# Patient Record
Sex: FEMALE | Race: WHITE | NOT HISPANIC OR LATINO | Employment: OTHER | URBAN - METROPOLITAN AREA
[De-identification: names, ages, dates, MRNs, and addresses within clinical notes are randomized per-mention and may not be internally consistent; named-entity substitution may affect disease eponyms.]

---

## 2023-06-24 RX ORDER — TRAZODONE HYDROCHLORIDE 50 MG/1
50 TABLET ORAL NIGHTLY
COMMUNITY
Start: 2013-07-10 | End: 2023-07-05 | Stop reason: SDUPTHER

## 2023-06-24 RX ORDER — AMLODIPINE BESYLATE 5 MG/1
1 TABLET ORAL DAILY
COMMUNITY
Start: 2022-08-29 | End: 2023-10-03 | Stop reason: ALTCHOICE

## 2023-06-24 RX ORDER — LOSARTAN POTASSIUM 25 MG/1
1 TABLET ORAL DAILY
COMMUNITY
Start: 2022-11-08 | End: 2023-07-05 | Stop reason: SDUPTHER

## 2023-06-24 RX ORDER — LEVOTHYROXINE SODIUM 112 UG/1
1 TABLET ORAL DAILY
COMMUNITY
Start: 2012-12-14 | End: 2023-07-05 | Stop reason: SDUPTHER

## 2023-06-24 RX ORDER — ASPIRIN 81 MG/1
1 TABLET ORAL DAILY
COMMUNITY
Start: 2022-08-29

## 2023-06-24 RX ORDER — ATORVASTATIN CALCIUM 40 MG/1
1 TABLET, FILM COATED ORAL DAILY
COMMUNITY
Start: 2022-02-01 | End: 2024-01-31 | Stop reason: SDUPTHER

## 2023-07-01 PROBLEM — I10 HYPERTENSION, ESSENTIAL: Status: ACTIVE | Noted: 2023-07-01

## 2023-07-01 PROBLEM — R41.3 MEMORY DIFFICULTIES: Status: ACTIVE | Noted: 2023-07-01

## 2023-07-01 PROBLEM — E55.9 VITAMIN D DEFICIENCY: Status: ACTIVE | Noted: 2023-07-01

## 2023-07-01 PROBLEM — N39.0 ACUTE UTI: Status: ACTIVE | Noted: 2023-07-01

## 2023-07-01 PROBLEM — H91.93 HEARING LOSS, BILATERAL: Status: ACTIVE | Noted: 2023-07-01

## 2023-07-01 PROBLEM — M81.0 SENILE OSTEOPOROSIS: Status: ACTIVE | Noted: 2023-07-01

## 2023-07-01 PROBLEM — R30.0 DYSURIA: Status: ACTIVE | Noted: 2023-07-01

## 2023-07-01 PROBLEM — G89.29 CHRONIC BILATERAL LOW BACK PAIN: Status: ACTIVE | Noted: 2023-07-01

## 2023-07-01 PROBLEM — H26.9 CATARACT, BILATERAL: Status: ACTIVE | Noted: 2023-07-01

## 2023-07-01 PROBLEM — E78.5 HYPERLIPIDEMIA: Status: ACTIVE | Noted: 2023-07-01

## 2023-07-01 PROBLEM — G47.00 INSOMNIA: Status: ACTIVE | Noted: 2023-07-01

## 2023-07-01 PROBLEM — K57.90 DIVERTICULOSIS: Status: ACTIVE | Noted: 2023-07-01

## 2023-07-01 PROBLEM — F41.9 ANXIETY DISORDER: Status: ACTIVE | Noted: 2023-07-01

## 2023-07-01 PROBLEM — H93.19 TINNITUS: Status: ACTIVE | Noted: 2023-07-01

## 2023-07-01 PROBLEM — I42.8 CARDIOMYOPATHY, NONISCHEMIC (MULTI): Status: ACTIVE | Noted: 2023-07-01

## 2023-07-01 PROBLEM — M85.80 OSTEOPENIA: Status: ACTIVE | Noted: 2023-07-01

## 2023-07-01 PROBLEM — E03.9 HYPOTHYROID: Status: ACTIVE | Noted: 2023-07-01

## 2023-07-01 PROBLEM — R06.09 DYSPNEA ON EXERTION: Status: ACTIVE | Noted: 2023-07-01

## 2023-07-01 PROBLEM — I69.321: Status: ACTIVE | Noted: 2023-07-01

## 2023-07-01 PROBLEM — H90.3 BILATERAL SENSORINEURAL HEARING LOSS: Status: ACTIVE | Noted: 2023-07-01

## 2023-07-01 PROBLEM — I49.9 CARDIAC ARRHYTHMIA: Status: ACTIVE | Noted: 2023-07-01

## 2023-07-01 PROBLEM — I63.81: Status: ACTIVE | Noted: 2023-07-01

## 2023-07-01 PROBLEM — M54.50 CHRONIC BILATERAL LOW BACK PAIN: Status: ACTIVE | Noted: 2023-07-01

## 2023-07-01 PROBLEM — R00.2 PALPITATIONS: Status: ACTIVE | Noted: 2023-07-01

## 2023-07-01 PROBLEM — I44.7 LEFT BUNDLE BRANCH BLOCK (LBBB): Status: ACTIVE | Noted: 2023-07-01

## 2023-07-01 PROBLEM — R53.83 FATIGUE: Status: ACTIVE | Noted: 2023-07-01

## 2023-07-01 PROBLEM — R94.31 ABNORMAL ECG: Status: ACTIVE | Noted: 2023-07-01

## 2023-07-01 PROBLEM — I63.9 CVA (CEREBRAL VASCULAR ACCIDENT) (MULTI): Status: ACTIVE | Noted: 2023-07-01

## 2023-07-05 ENCOUNTER — OFFICE VISIT (OUTPATIENT)
Dept: PRIMARY CARE | Facility: CLINIC | Age: 83
End: 2023-07-05
Payer: MEDICARE

## 2023-07-05 VITALS
BODY MASS INDEX: 23.32 KG/M2 | DIASTOLIC BLOOD PRESSURE: 52 MMHG | OXYGEN SATURATION: 98 % | WEIGHT: 140 LBS | HEART RATE: 73 BPM | SYSTOLIC BLOOD PRESSURE: 116 MMHG | HEIGHT: 65 IN

## 2023-07-05 DIAGNOSIS — E03.9 HYPOTHYROIDISM, UNSPECIFIED TYPE: ICD-10-CM

## 2023-07-05 DIAGNOSIS — G47.00 INSOMNIA, UNSPECIFIED TYPE: Primary | ICD-10-CM

## 2023-07-05 DIAGNOSIS — R53.83 FATIGUE, UNSPECIFIED TYPE: ICD-10-CM

## 2023-07-05 DIAGNOSIS — I42.8 CARDIOMYOPATHY, NONISCHEMIC (MULTI): ICD-10-CM

## 2023-07-05 DIAGNOSIS — I10 HYPERTENSION, UNSPECIFIED TYPE: ICD-10-CM

## 2023-07-05 PROCEDURE — 1036F TOBACCO NON-USER: CPT | Performed by: FAMILY MEDICINE

## 2023-07-05 PROCEDURE — 3078F DIAST BP <80 MM HG: CPT | Performed by: FAMILY MEDICINE

## 2023-07-05 PROCEDURE — 1160F RVW MEDS BY RX/DR IN RCRD: CPT | Performed by: FAMILY MEDICINE

## 2023-07-05 PROCEDURE — 3074F SYST BP LT 130 MM HG: CPT | Performed by: FAMILY MEDICINE

## 2023-07-05 PROCEDURE — 99214 OFFICE O/P EST MOD 30 MIN: CPT | Performed by: FAMILY MEDICINE

## 2023-07-05 PROCEDURE — 1159F MED LIST DOCD IN RCRD: CPT | Performed by: FAMILY MEDICINE

## 2023-07-05 RX ORDER — TRAZODONE HYDROCHLORIDE 50 MG/1
50 TABLET ORAL NIGHTLY
Qty: 90 TABLET | Refills: 2 | Status: SHIPPED | OUTPATIENT
Start: 2023-07-05 | End: 2024-01-25 | Stop reason: SDUPTHER

## 2023-07-05 RX ORDER — LEVOTHYROXINE SODIUM 112 UG/1
112 TABLET ORAL DAILY
Qty: 90 TABLET | Refills: 2 | Status: SHIPPED | OUTPATIENT
Start: 2023-07-05 | End: 2023-10-03 | Stop reason: SDUPTHER

## 2023-07-05 RX ORDER — LOSARTAN POTASSIUM 25 MG/1
25 TABLET ORAL DAILY
Qty: 90 TABLET | Refills: 2 | Status: SHIPPED | OUTPATIENT
Start: 2023-07-05 | End: 2024-01-31 | Stop reason: SDUPTHER

## 2023-07-05 ASSESSMENT — PATIENT HEALTH QUESTIONNAIRE - PHQ9
1. LITTLE INTEREST OR PLEASURE IN DOING THINGS: NOT AT ALL
SUM OF ALL RESPONSES TO PHQ9 QUESTIONS 1 & 2: 0
2. FEELING DOWN, DEPRESSED OR HOPELESS: NOT AT ALL

## 2023-07-05 NOTE — PROGRESS NOTES
General Medical Management Note    82 y.o. female presents for Medical Management  HPI    82-year-old female presents independently.  No recent hospitalizations, surgeries or significant injuries.    Compliant with medications although seems to have slight confusion about which physician refills which medications.  Dr. Del Castillo is her cardiologist.  [facility] and all prescription medical list indicates patient is taking Norvasc 5 mg daily prescribed by Dr. Del Castillo, Lipitor 40 mg daily prescribed by Dr. Del Castillo and Cozaar 25 mg prescribed by Dr. Del Castillo.  Patient believes that Dr. Barney is prescribing Cozaar.    Hypothyroidism and insomnia are well controlled with current medications.    Past Medical History:   Diagnosis Date    Acute embolism and thrombosis of unspecified deep veins of right lower extremity (CMS/[facility]) 12/28/2016    Deep vein blood clot of right lower extremity    Acute respiratory failure with hypoxia (CMS/[facility]) 02/02/2019    Acute hypoxemic respiratory failure    Diverticulosis of intestine, part unspecified, without perforation or abscess without bleeding 12/08/2013    Diverticulosis    Other conditions influencing health status     No significant past medical history    Other general symptoms and signs 02/05/2018    Flu-like symptoms    Personal history of diseases of the blood and blood-forming organs and certain disorders involving the immune mechanism 01/10/2019    History of anemia    Personal history of diseases of the blood and blood-forming organs and certain disorders involving the immune mechanism 02/02/2019    History of leukocytosis    Personal history of other drug therapy 08/23/2017    History of influenza vaccination    Personal history of other medical treatment 08/17/2020    History of screening mammography    Phlebitis and thrombophlebitis of lower extremities, unspecified 12/28/2016    Phlebitis and thrombophlebitis of lower extremities    Syncope and collapse 12/29/2017    Near syncope       Past Surgical History:   Procedure Laterality Date    OTHER SURGICAL HISTORY  06/27/2022    Colonoscopy     Family History   Problem Relation Name Age of Onset    Lung cancer Mother      Other (malignant neoplasm of breast) Mother      Hyperlipidemia Father      Hyperlipidemia Brother      Other (malignant neoplasm of prostate) Son        Social History     Socioeconomic History    Marital status:      Spouse name: Not on file    Number of children: Not on file    Years of education: Not on file    Highest education level: Not on file   Occupational History    Not on file   Tobacco Use    Smoking status: Never    Smokeless tobacco: Never   Substance and Sexual Activity    Alcohol use: Not on file    Drug use: Not on file    Sexual activity: Not on file   Other Topics Concern    Not on file   Social History Narrative    Not on file     Social Determinants of Health     Financial Resource Strain: Not on file   Food Insecurity: Not on file   Transportation Needs: Not on file   Physical Activity: Not on file   Stress: Not on file   Social Connections: Not on file   Intimate Partner Violence: Not on file   Housing Stability: Not on file       Current Outpatient Medications on File Prior to Visit   Medication Sig Dispense Refill    amLODIPine (Norvasc) 5 mg tablet Take 1 tablet (5 mg) by mouth once daily.      aspirin 81 mg EC tablet Take 1 tablet (81 mg) by mouth once daily.      atorvastatin (Lipitor) 40 mg tablet Take 1 tablet (40 mg) by mouth once daily.      levothyroxine (Synthroid, Levoxyl) 112 mcg tablet Take 1 tablet (112 mcg) by mouth once daily.      losartan (Cozaar) 25 mg tablet Take 1 tablet (25 mg) by mouth once daily.      traZODone (Desyrel) 50 mg tablet Take 1 tablet (50 mg) by mouth once daily at bedtime.       No current facility-administered medications on file prior to visit.       Allergies   Allergen Reactions    Penicillins Hives and Swelling    Sulfamethoxazole Hives         ROS:  "Denies chest pain, SOB, Headache, GI problems     Visit Vitals  /52   Pulse 73   Ht 1.651 m (5' 5\")   Wt 63.5 kg (140 lb)   SpO2 98%   BMI 23.30 kg/m²   Smoking Status Never   BSA 1.71 m²        PHYSICAL EXAM:  Alert and oriented x3.  Eyes: EOM grossly intact  Neck supple without lymph adenopathy or carotid bruit.  No masses or thyromegaly  Heart regular rate and rhythm without murmur.  Lungs clear to auscultation.  Legs without edema.  Gait is non-antalgic  Speech clear.  Hearing adequate.          DIAGNOSIS/PLAN:    1. Hypertension, unspecified type  Hypertension: Discussed importance of good blood pressure control to avoid long-term complications such as heart attack and stroke.  Patient is aware that blood pressure goal is less than 130/80.  Maintaining a regular exercise program and body mass index (BMI) less than 25 as well as a diet lower in carbohydrates will help reach these goals.  - Comprehensive Metabolic Panel; Future  - losartan (Cozaar) 25 mg tablet; Take 1 tablet (25 mg) by mouth once daily.  Dispense: 90 tablet; Refill: 2    2. Insomnia, unspecified type  - traZODone (Desyrel) 50 mg tablet; Take 1 tablet (50 mg) by mouth once daily at bedtime.  Dispense: 90 tablet; Refill: 2    3. Hypothyroidism, unspecified type  - levothyroxine (Synthroid, Levoxyl) 112 mcg tablet; Take 1 tablet (112 mcg) by mouth once daily.  Dispense: 90 tablet; Refill: 2    5. Cardiomyopathy, nonischemic (CMS/HCC)  Follow-up with cardiology        Return to office in 6 months for comprehensive medical evaluation, long-term medication use monitoring, and preventative services screening    We will continue to monitor, evaluate, assess and treat all problems/diagnoses as appropriate and continue to collaborate with specialists.    Encouraged to sign up with [facility]    Contact office or send a Follow My Health message with any questions or concerns    Patient will only be notified of labs that require medical " intervention.    Prescriptions will not be filled unless you are compliant with your follow up appointments or have a follow up appointment scheduled as per instruction of your physician. Refills should be requested at the time of your visit.    **Charting was completed using voice recognition technology and may include unintended errors**    Wing Barney DO, LAY  [id number]  [place] [address]  [phone]        Wing Barney DO, LAY

## 2023-08-30 ENCOUNTER — LAB (OUTPATIENT)
Dept: LAB | Facility: LAB | Age: 83
End: 2023-08-30
Payer: MEDICARE

## 2023-08-30 DIAGNOSIS — I10 HYPERTENSION, UNSPECIFIED TYPE: ICD-10-CM

## 2023-08-30 DIAGNOSIS — R53.83 FATIGUE, UNSPECIFIED TYPE: ICD-10-CM

## 2023-08-30 LAB
ALANINE AMINOTRANSFERASE (SGPT) (U/L) IN SER/PLAS: 23 U/L (ref 7–45)
ALBUMIN (G/DL) IN SER/PLAS: 4.1 G/DL (ref 3.4–5)
ALKALINE PHOSPHATASE (U/L) IN SER/PLAS: 83 U/L (ref 33–136)
ANION GAP IN SER/PLAS: 12 MMOL/L (ref 10–20)
ASPARTATE AMINOTRANSFERASE (SGOT) (U/L) IN SER/PLAS: 25 U/L (ref 9–39)
BILIRUBIN TOTAL (MG/DL) IN SER/PLAS: 0.7 MG/DL (ref 0–1.2)
CALCIUM (MG/DL) IN SER/PLAS: 9.3 MG/DL (ref 8.6–10.3)
CARBON DIOXIDE, TOTAL (MMOL/L) IN SER/PLAS: 31 MMOL/L (ref 21–32)
CHLORIDE (MMOL/L) IN SER/PLAS: 102 MMOL/L (ref 98–107)
CREATININE (MG/DL) IN SER/PLAS: 0.69 MG/DL (ref 0.5–1.05)
GFR FEMALE: 87 ML/MIN/1.73M2
GLUCOSE (MG/DL) IN SER/PLAS: 110 MG/DL (ref 74–99)
POTASSIUM (MMOL/L) IN SER/PLAS: 4 MMOL/L (ref 3.5–5.3)
PROTEIN TOTAL: 6.6 G/DL (ref 6.4–8.2)
SODIUM (MMOL/L) IN SER/PLAS: 141 MMOL/L (ref 136–145)
UREA NITROGEN (MG/DL) IN SER/PLAS: 13 MG/DL (ref 6–23)

## 2023-08-30 PROCEDURE — 36415 COLL VENOUS BLD VENIPUNCTURE: CPT

## 2023-08-30 PROCEDURE — 80053 COMPREHEN METABOLIC PANEL: CPT

## 2023-09-13 PROBLEM — L81.4 OTHER MELANIN HYPERPIGMENTATION: Status: ACTIVE | Noted: 2021-07-28

## 2023-09-13 PROBLEM — R21 RASH AND OTHER NONSPECIFIC SKIN ERUPTION: Status: ACTIVE | Noted: 2021-07-28

## 2023-09-13 PROBLEM — L91.8 OTHER HYPERTROPHIC DISORDERS OF THE SKIN: Status: ACTIVE | Noted: 2021-07-28

## 2023-09-13 PROBLEM — L82.1 OTHER SEBORRHEIC KERATOSIS: Status: ACTIVE | Noted: 2021-07-28

## 2023-09-13 PROBLEM — R29.898 LEFT ARM WEAKNESS: Status: ACTIVE | Noted: 2022-08-20

## 2023-09-14 ENCOUNTER — APPOINTMENT (OUTPATIENT)
Dept: PRIMARY CARE | Facility: CLINIC | Age: 83
End: 2023-09-14
Payer: MEDICARE

## 2023-09-21 ENCOUNTER — APPOINTMENT (OUTPATIENT)
Dept: PRIMARY CARE | Facility: CLINIC | Age: 83
End: 2023-09-21
Payer: MEDICARE

## 2023-09-25 ENCOUNTER — APPOINTMENT (OUTPATIENT)
Dept: PRIMARY CARE | Facility: CLINIC | Age: 83
End: 2023-09-25
Payer: MEDICARE

## 2023-10-03 ENCOUNTER — OFFICE VISIT (OUTPATIENT)
Dept: PRIMARY CARE | Facility: CLINIC | Age: 83
End: 2023-10-03
Payer: MEDICARE

## 2023-10-03 VITALS
HEIGHT: 65 IN | HEART RATE: 66 BPM | WEIGHT: 137 LBS | OXYGEN SATURATION: 98 % | SYSTOLIC BLOOD PRESSURE: 155 MMHG | DIASTOLIC BLOOD PRESSURE: 56 MMHG | BODY MASS INDEX: 22.82 KG/M2

## 2023-10-03 DIAGNOSIS — E03.9 HYPOTHYROIDISM, UNSPECIFIED TYPE: ICD-10-CM

## 2023-10-03 DIAGNOSIS — R06.02 SOB (SHORTNESS OF BREATH) ON EXERTION: ICD-10-CM

## 2023-10-03 DIAGNOSIS — I49.9 CARDIAC ARRHYTHMIA, UNSPECIFIED CARDIAC ARRHYTHMIA TYPE: Primary | ICD-10-CM

## 2023-10-03 DIAGNOSIS — Z23 NEED FOR INFLUENZA VACCINATION: ICD-10-CM

## 2023-10-03 PROCEDURE — 90686 IIV4 VACC NO PRSV 0.5 ML IM: CPT | Performed by: NURSE PRACTITIONER

## 2023-10-03 PROCEDURE — G0008 ADMIN INFLUENZA VIRUS VAC: HCPCS | Performed by: NURSE PRACTITIONER

## 2023-10-03 PROCEDURE — 1160F RVW MEDS BY RX/DR IN RCRD: CPT | Performed by: NURSE PRACTITIONER

## 2023-10-03 PROCEDURE — 99214 OFFICE O/P EST MOD 30 MIN: CPT | Performed by: NURSE PRACTITIONER

## 2023-10-03 PROCEDURE — 3078F DIAST BP <80 MM HG: CPT | Performed by: NURSE PRACTITIONER

## 2023-10-03 PROCEDURE — 1159F MED LIST DOCD IN RCRD: CPT | Performed by: NURSE PRACTITIONER

## 2023-10-03 PROCEDURE — 1036F TOBACCO NON-USER: CPT | Performed by: NURSE PRACTITIONER

## 2023-10-03 PROCEDURE — 3077F SYST BP >= 140 MM HG: CPT | Performed by: NURSE PRACTITIONER

## 2023-10-03 RX ORDER — LEVOTHYROXINE SODIUM 112 UG/1
112 TABLET ORAL DAILY
Qty: 90 TABLET | Refills: 2 | Status: SHIPPED | OUTPATIENT
Start: 2023-10-03 | End: 2024-01-25 | Stop reason: SDUPTHER

## 2023-10-03 ASSESSMENT — PATIENT HEALTH QUESTIONNAIRE - PHQ9
1. LITTLE INTEREST OR PLEASURE IN DOING THINGS: NOT AT ALL
2. FEELING DOWN, DEPRESSED OR HOPELESS: NOT AT ALL
SUM OF ALL RESPONSES TO PHQ9 QUESTIONS 1 & 2: 0

## 2023-10-03 NOTE — PROGRESS NOTES
"Subjective   Patient ID: Mallory Meyer is a 83 y.o. female who presents for Shortness of breath when walking long distances and Back and shoulder pain.    HPI     States over the past 6 months she has developed SOB after walking 1/2 mile.   States she is not recovering as well as she used to recover.   Notices at times she becomes a bit dizzy too.   No CP or diaphoresis.     States at times when she stands up she becomes a bit dizzy too.     Notices that her heart beats irregularly for short periods of time  Her cardiologist is Dr Wing Del Castillo.   She has previously completed a workup--holter monitor and EPS referral.   Pt states the EPS wanted to \"place some device\".     She c/o neck, shoulder and upper back discomfort.       Review of Systems    Objective   /62   Pulse 59   Ht 1.651 m (5' 5\")   Wt 62.1 kg (137 lb)   SpO2 98%   BMI 22.80 kg/m²     Physical Exam    Alert and oriented x 3  Eyes: EOM grossly intact  Neck supple without lymph adenopathy or carotid bruit  Heart regular rate and rhythm without murmur however at times there are irregular beats auscultated  Speech clear.  Hearing adequate.  Psych: Normal affect. Good judgment and insight.       Assessment/Plan   1. Cardiac arrhythmia, unspecified cardiac arrhythmia type, SOB, Dizziness:   She needs to contact cardio for an appt.   I was unable to find any notes from an EPS physican    2. Need for influenza vaccination    - Flu vaccine (IIV4) age 6 months and greater, preservative free    3. Hypothyroidism, unspecified type    - levothyroxine (Synthroid, Levoxyl) 112 mcg tablet; Take 1 tablet (112 mcg) by mouth once daily.  Dispense: 90 tablet; Refill: 2        Contact office with any questions or concerns.   Preferred communication is via  [employer]  Please contact [email or web address] if having issues with  [employer]    Melania Raya APRN-[facility]  [address], [address]  [place], [place] " [address]  Phone: [phone]    **Charting was completed using voice recognition technology and may include unintended errors**

## 2023-12-01 ENCOUNTER — ANCILLARY PROCEDURE (OUTPATIENT)
Dept: RADIOLOGY | Facility: CLINIC | Age: 83
End: 2023-12-01
Payer: MEDICARE

## 2023-12-01 DIAGNOSIS — Z12.31 ENCOUNTER FOR SCREENING MAMMOGRAM FOR MALIGNANT NEOPLASM OF BREAST: ICD-10-CM

## 2023-12-01 DIAGNOSIS — Z12.39 ENCOUNTER FOR OTHER SCREENING FOR MALIGNANT NEOPLASM OF BREAST: ICD-10-CM

## 2023-12-01 PROCEDURE — 77067 SCR MAMMO BI INCL CAD: CPT | Mod: BILATERAL PROCEDURE | Performed by: RADIOLOGY

## 2023-12-01 PROCEDURE — 77063 BREAST TOMOSYNTHESIS BI: CPT | Mod: BILATERAL PROCEDURE | Performed by: RADIOLOGY

## 2023-12-01 PROCEDURE — 77063 BREAST TOMOSYNTHESIS BI: CPT

## 2024-01-02 ENCOUNTER — APPOINTMENT (OUTPATIENT)
Dept: RADIOLOGY | Facility: HOSPITAL | Age: 84
DRG: 177 | End: 2024-01-02
Payer: MEDICARE

## 2024-01-02 ENCOUNTER — HOSPITAL ENCOUNTER (INPATIENT)
Facility: HOSPITAL | Age: 84
LOS: 2 days | Discharge: HOME | DRG: 177 | End: 2024-01-05
Attending: EMERGENCY MEDICINE | Admitting: INTERNAL MEDICINE
Payer: MEDICARE

## 2024-01-02 ENCOUNTER — APPOINTMENT (OUTPATIENT)
Dept: CARDIOLOGY | Facility: HOSPITAL | Age: 84
DRG: 177 | End: 2024-01-02
Payer: MEDICARE

## 2024-01-02 DIAGNOSIS — R19.7 NAUSEA VOMITING AND DIARRHEA: ICD-10-CM

## 2024-01-02 DIAGNOSIS — R11.2 NAUSEA VOMITING AND DIARRHEA: ICD-10-CM

## 2024-01-02 DIAGNOSIS — U07.1 COVID-19 VIREMIA: Primary | ICD-10-CM

## 2024-01-02 DIAGNOSIS — U07.1 COVID-19: ICD-10-CM

## 2024-01-02 DIAGNOSIS — R06.02 SOB (SHORTNESS OF BREATH) ON EXERTION: ICD-10-CM

## 2024-01-02 DIAGNOSIS — J96.01 ACUTE HYPOXEMIC RESPIRATORY FAILURE DUE TO COVID-19 (MULTI): ICD-10-CM

## 2024-01-02 DIAGNOSIS — I42.8 CARDIOMYOPATHY, NONISCHEMIC (MULTI): ICD-10-CM

## 2024-01-02 DIAGNOSIS — I63.81: ICD-10-CM

## 2024-01-02 DIAGNOSIS — U07.1 ACUTE HYPOXEMIC RESPIRATORY FAILURE DUE TO COVID-19 (MULTI): ICD-10-CM

## 2024-01-02 LAB
ALBUMIN SERPL BCP-MCNC: 3.9 G/DL (ref 3.4–5)
ALP SERPL-CCNC: 73 U/L (ref 33–136)
ALT SERPL W P-5'-P-CCNC: 14 U/L (ref 7–45)
ANION GAP SERPL CALC-SCNC: 14 MMOL/L (ref 10–20)
APPEARANCE UR: CLEAR
AST SERPL W P-5'-P-CCNC: 20 U/L (ref 9–39)
BACTERIA #/AREA URNS AUTO: ABNORMAL /HPF
BASOPHILS # BLD AUTO: 0 X10*3/UL (ref 0–0.1)
BASOPHILS NFR BLD AUTO: 0 %
BILIRUB SERPL-MCNC: 0.8 MG/DL (ref 0–1.2)
BILIRUB UR STRIP.AUTO-MCNC: NEGATIVE MG/DL
BUN SERPL-MCNC: 10 MG/DL (ref 6–23)
CALCIUM SERPL-MCNC: 8.7 MG/DL (ref 8.6–10.3)
CARDIAC TROPONIN I PNL SERPL HS: 48 NG/L (ref 0–13)
CARDIAC TROPONIN I PNL SERPL HS: 49 NG/L (ref 0–13)
CHLORIDE SERPL-SCNC: 101 MMOL/L (ref 98–107)
CO2 SERPL-SCNC: 27 MMOL/L (ref 21–32)
COLOR UR: YELLOW
CREAT SERPL-MCNC: 0.58 MG/DL (ref 0.5–1.05)
D DIMER PPP FEU-MCNC: 1159 NG/ML FEU
EOSINOPHIL # BLD AUTO: 0.01 X10*3/UL (ref 0–0.4)
EOSINOPHIL NFR BLD AUTO: 0.1 %
ERYTHROCYTE [DISTWIDTH] IN BLOOD BY AUTOMATED COUNT: 12.9 % (ref 11.5–14.5)
FLUAV RNA RESP QL NAA+PROBE: NOT DETECTED
FLUBV RNA RESP QL NAA+PROBE: NOT DETECTED
GFR SERPL CREATININE-BSD FRML MDRD: >90 ML/MIN/1.73M*2
GLUCOSE SERPL-MCNC: 125 MG/DL (ref 74–99)
GLUCOSE UR STRIP.AUTO-MCNC: NEGATIVE MG/DL
HCT VFR BLD AUTO: 34.6 % (ref 36–46)
HGB BLD-MCNC: 11.2 G/DL (ref 12–16)
IMM GRANULOCYTES # BLD AUTO: 0.02 X10*3/UL (ref 0–0.5)
IMM GRANULOCYTES NFR BLD AUTO: 0.2 % (ref 0–0.9)
KETONES UR STRIP.AUTO-MCNC: ABNORMAL MG/DL
LEUKOCYTE ESTERASE UR QL STRIP.AUTO: ABNORMAL
LIPASE SERPL-CCNC: 8 U/L (ref 9–82)
LYMPHOCYTES # BLD AUTO: 0.48 X10*3/UL (ref 0.8–3)
LYMPHOCYTES NFR BLD AUTO: 5.7 %
MAGNESIUM SERPL-MCNC: 1.74 MG/DL (ref 1.6–2.4)
MAGNESIUM SERPL-MCNC: 2.33 MG/DL (ref 1.6–2.4)
MCH RBC QN AUTO: 29.2 PG (ref 26–34)
MCHC RBC AUTO-ENTMCNC: 32.4 G/DL (ref 32–36)
MCV RBC AUTO: 90 FL (ref 80–100)
MONOCYTES # BLD AUTO: 0.69 X10*3/UL (ref 0.05–0.8)
MONOCYTES NFR BLD AUTO: 8.2 %
MRSA DNA SPEC QL NAA+PROBE: NOT DETECTED
NEUTROPHILS # BLD AUTO: 7.18 X10*3/UL (ref 1.6–5.5)
NEUTROPHILS NFR BLD AUTO: 85.8 %
NITRITE UR QL STRIP.AUTO: NEGATIVE
NRBC BLD-RTO: 0 /100 WBCS (ref 0–0)
PH UR STRIP.AUTO: 5 [PH]
PLATELET # BLD AUTO: 153 X10*3/UL (ref 150–450)
POTASSIUM SERPL-SCNC: 3.6 MMOL/L (ref 3.5–5.3)
PROCALCITONIN SERPL-MCNC: 0.11 NG/ML
PROT SERPL-MCNC: 6.4 G/DL (ref 6.4–8.2)
PROT UR STRIP.AUTO-MCNC: ABNORMAL MG/DL
RBC # BLD AUTO: 3.84 X10*6/UL (ref 4–5.2)
RBC # UR STRIP.AUTO: ABNORMAL /UL
RBC #/AREA URNS AUTO: ABNORMAL /HPF
SARS-COV-2 RNA RESP QL NAA+PROBE: DETECTED
SODIUM SERPL-SCNC: 138 MMOL/L (ref 136–145)
SP GR UR STRIP.AUTO: 1.02
UROBILINOGEN UR STRIP.AUTO-MCNC: <2 MG/DL
WBC # BLD AUTO: 8.4 X10*3/UL (ref 4.4–11.3)
WBC #/AREA URNS AUTO: ABNORMAL /HPF

## 2024-01-02 PROCEDURE — 2500000004 HC RX 250 GENERAL PHARMACY W/ HCPCS (ALT 636 FOR OP/ED): Performed by: EMERGENCY MEDICINE

## 2024-01-02 PROCEDURE — 96366 THER/PROPH/DIAG IV INF ADDON: CPT

## 2024-01-02 PROCEDURE — 99285 EMERGENCY DEPT VISIT HI MDM: CPT | Mod: CS | Performed by: EMERGENCY MEDICINE

## 2024-01-02 PROCEDURE — 84484 ASSAY OF TROPONIN QUANT: CPT | Performed by: EMERGENCY MEDICINE

## 2024-01-02 PROCEDURE — 93005 ELECTROCARDIOGRAM TRACING: CPT

## 2024-01-02 PROCEDURE — 71275 CT ANGIOGRAPHY CHEST: CPT | Mod: FOREIGN READ | Performed by: RADIOLOGY

## 2024-01-02 PROCEDURE — 2500000001 HC RX 250 WO HCPCS SELF ADMINISTERED DRUGS (ALT 637 FOR MEDICARE OP)

## 2024-01-02 PROCEDURE — 80053 COMPREHEN METABOLIC PANEL: CPT | Performed by: EMERGENCY MEDICINE

## 2024-01-02 PROCEDURE — 93010 ELECTROCARDIOGRAM REPORT: CPT | Performed by: INTERNAL MEDICINE

## 2024-01-02 PROCEDURE — 2550000001 HC RX 255 CONTRASTS: Performed by: INTERNAL MEDICINE

## 2024-01-02 PROCEDURE — G0378 HOSPITAL OBSERVATION PER HR: HCPCS

## 2024-01-02 PROCEDURE — 81001 URINALYSIS AUTO W/SCOPE: CPT | Performed by: EMERGENCY MEDICINE

## 2024-01-02 PROCEDURE — 99223 1ST HOSP IP/OBS HIGH 75: CPT

## 2024-01-02 PROCEDURE — 3E0DX3Z INTRODUCTION OF ANTI-INFLAMMATORY INTO MOUTH AND PHARYNX, EXTERNAL APPROACH: ICD-10-PCS | Performed by: STUDENT IN AN ORGANIZED HEALTH CARE EDUCATION/TRAINING PROGRAM

## 2024-01-02 PROCEDURE — 2500000004 HC RX 250 GENERAL PHARMACY W/ HCPCS (ALT 636 FOR OP/ED)

## 2024-01-02 PROCEDURE — 96372 THER/PROPH/DIAG INJ SC/IM: CPT

## 2024-01-02 PROCEDURE — 96375 TX/PRO/DX INJ NEW DRUG ADDON: CPT

## 2024-01-02 PROCEDURE — 36415 COLL VENOUS BLD VENIPUNCTURE: CPT | Performed by: EMERGENCY MEDICINE

## 2024-01-02 PROCEDURE — XW033E5 INTRODUCTION OF REMDESIVIR ANTI-INFECTIVE INTO PERIPHERAL VEIN, PERCUTANEOUS APPROACH, NEW TECHNOLOGY GROUP 5: ICD-10-PCS | Performed by: STUDENT IN AN ORGANIZED HEALTH CARE EDUCATION/TRAINING PROGRAM

## 2024-01-02 PROCEDURE — 96361 HYDRATE IV INFUSION ADD-ON: CPT

## 2024-01-02 PROCEDURE — 71045 X-RAY EXAM CHEST 1 VIEW: CPT

## 2024-01-02 PROCEDURE — 71045 X-RAY EXAM CHEST 1 VIEW: CPT | Performed by: RADIOLOGY

## 2024-01-02 PROCEDURE — 2500000004 HC RX 250 GENERAL PHARMACY W/ HCPCS (ALT 636 FOR OP/ED): Performed by: INTERNAL MEDICINE

## 2024-01-02 PROCEDURE — 83735 ASSAY OF MAGNESIUM: CPT

## 2024-01-02 PROCEDURE — 71275 CT ANGIOGRAPHY CHEST: CPT | Mod: FR

## 2024-01-02 PROCEDURE — 2500000001 HC RX 250 WO HCPCS SELF ADMINISTERED DRUGS (ALT 637 FOR MEDICARE OP): Performed by: INTERNAL MEDICINE

## 2024-01-02 PROCEDURE — 5A0935A ASSISTANCE WITH RESPIRATORY VENTILATION, LESS THAN 24 CONSECUTIVE HOURS, HIGH NASAL FLOW/VELOCITY: ICD-10-PCS | Performed by: STUDENT IN AN ORGANIZED HEALTH CARE EDUCATION/TRAINING PROGRAM

## 2024-01-02 PROCEDURE — 87641 MR-STAPH DNA AMP PROBE: CPT

## 2024-01-02 PROCEDURE — 87636 SARSCOV2 & INF A&B AMP PRB: CPT | Performed by: EMERGENCY MEDICINE

## 2024-01-02 PROCEDURE — 2500000005 HC RX 250 GENERAL PHARMACY W/O HCPCS: Mod: JZ

## 2024-01-02 PROCEDURE — 85025 COMPLETE CBC W/AUTO DIFF WBC: CPT | Performed by: EMERGENCY MEDICINE

## 2024-01-02 PROCEDURE — 83735 ASSAY OF MAGNESIUM: CPT | Performed by: EMERGENCY MEDICINE

## 2024-01-02 PROCEDURE — 83690 ASSAY OF LIPASE: CPT | Performed by: EMERGENCY MEDICINE

## 2024-01-02 PROCEDURE — 96367 TX/PROPH/DG ADDL SEQ IV INF: CPT

## 2024-01-02 PROCEDURE — 85379 FIBRIN DEGRADATION QUANT: CPT

## 2024-01-02 PROCEDURE — 96365 THER/PROPH/DIAG IV INF INIT: CPT | Mod: 59

## 2024-01-02 PROCEDURE — 84145 PROCALCITONIN (PCT): CPT | Mod: STJLAB | Performed by: INTERNAL MEDICINE

## 2024-01-02 RX ORDER — CEFTRIAXONE 2 G/50ML
2 INJECTION, SOLUTION INTRAVENOUS EVERY 24 HOURS
Status: DISCONTINUED | OUTPATIENT
Start: 2024-01-02 | End: 2024-01-03

## 2024-01-02 RX ORDER — ONDANSETRON HYDROCHLORIDE 2 MG/ML
4 INJECTION, SOLUTION INTRAVENOUS ONCE
Status: COMPLETED | OUTPATIENT
Start: 2024-01-02 | End: 2024-01-02

## 2024-01-02 RX ORDER — TRAZODONE HYDROCHLORIDE 50 MG/1
50 TABLET ORAL NIGHTLY
Status: DISCONTINUED | OUTPATIENT
Start: 2024-01-02 | End: 2024-01-05 | Stop reason: HOSPADM

## 2024-01-02 RX ORDER — LEVOTHYROXINE SODIUM 112 UG/1
112 TABLET ORAL DAILY
Status: DISCONTINUED | OUTPATIENT
Start: 2024-01-02 | End: 2024-01-05 | Stop reason: HOSPADM

## 2024-01-02 RX ORDER — ACETAMINOPHEN 325 MG/1
650 TABLET ORAL EVERY 4 HOURS PRN
Status: DISCONTINUED | OUTPATIENT
Start: 2024-01-02 | End: 2024-01-05

## 2024-01-02 RX ORDER — LANOLIN ALCOHOL/MO/W.PET/CERES
400 CREAM (GRAM) TOPICAL DAILY
Status: COMPLETED | OUTPATIENT
Start: 2024-01-02 | End: 2024-01-02

## 2024-01-02 RX ORDER — ASPIRIN 81 MG/1
81 TABLET ORAL DAILY
Status: DISCONTINUED | OUTPATIENT
Start: 2024-01-02 | End: 2024-01-05 | Stop reason: HOSPADM

## 2024-01-02 RX ORDER — GUAIFENESIN/DEXTROMETHORPHAN 100-10MG/5
5 SYRUP ORAL EVERY 4 HOURS PRN
Status: DISCONTINUED | OUTPATIENT
Start: 2024-01-02 | End: 2024-01-05 | Stop reason: HOSPADM

## 2024-01-02 RX ORDER — MAGNESIUM SULFATE HEPTAHYDRATE 40 MG/ML
2 INJECTION, SOLUTION INTRAVENOUS ONCE
Status: COMPLETED | OUTPATIENT
Start: 2024-01-02 | End: 2024-01-02

## 2024-01-02 RX ORDER — DEXAMETHASONE 6 MG/1
6 TABLET ORAL DAILY
Status: DISCONTINUED | OUTPATIENT
Start: 2024-01-02 | End: 2024-01-05

## 2024-01-02 RX ORDER — SCOLOPAMINE TRANSDERMAL SYSTEM 1 MG/1
1 PATCH, EXTENDED RELEASE TRANSDERMAL
Status: DISCONTINUED | OUTPATIENT
Start: 2024-01-02 | End: 2024-01-05 | Stop reason: HOSPADM

## 2024-01-02 RX ORDER — LOSARTAN POTASSIUM 25 MG/1
25 TABLET ORAL DAILY
Status: DISCONTINUED | OUTPATIENT
Start: 2024-01-02 | End: 2024-01-05 | Stop reason: HOSPADM

## 2024-01-02 RX ORDER — GUAIFENESIN 600 MG/1
600 TABLET, EXTENDED RELEASE ORAL EVERY 12 HOURS PRN
Status: DISCONTINUED | OUTPATIENT
Start: 2024-01-02 | End: 2024-01-03

## 2024-01-02 RX ORDER — ENOXAPARIN SODIUM 100 MG/ML
40 INJECTION SUBCUTANEOUS EVERY 24 HOURS
Status: DISCONTINUED | OUTPATIENT
Start: 2024-01-02 | End: 2024-01-05 | Stop reason: HOSPADM

## 2024-01-02 RX ORDER — POTASSIUM CHLORIDE 1.5 G/1.58G
40 POWDER, FOR SOLUTION ORAL ONCE
Status: COMPLETED | OUTPATIENT
Start: 2024-01-02 | End: 2024-01-02

## 2024-01-02 RX ORDER — ATORVASTATIN CALCIUM 40 MG/1
40 TABLET, FILM COATED ORAL NIGHTLY
Status: DISCONTINUED | OUTPATIENT
Start: 2024-01-02 | End: 2024-01-05 | Stop reason: HOSPADM

## 2024-01-02 RX ORDER — DOXYCYCLINE 100 MG/1
100 CAPSULE ORAL EVERY 12 HOURS SCHEDULED
Status: DISCONTINUED | OUTPATIENT
Start: 2024-01-02 | End: 2024-01-03

## 2024-01-02 RX ORDER — POTASSIUM CHLORIDE 20 MEQ/1
40 TABLET, EXTENDED RELEASE ORAL ONCE
Status: COMPLETED | OUTPATIENT
Start: 2024-01-02 | End: 2024-01-02

## 2024-01-02 RX ORDER — ACETAMINOPHEN 650 MG/1
650 SUPPOSITORY RECTAL EVERY 4 HOURS PRN
Status: DISCONTINUED | OUTPATIENT
Start: 2024-01-02 | End: 2024-01-05

## 2024-01-02 RX ORDER — ACETAMINOPHEN 325 MG/1
650 TABLET ORAL ONCE
Status: COMPLETED | OUTPATIENT
Start: 2024-01-02 | End: 2024-01-02

## 2024-01-02 RX ORDER — ACETAMINOPHEN 160 MG/5ML
650 SOLUTION ORAL EVERY 4 HOURS PRN
Status: DISCONTINUED | OUTPATIENT
Start: 2024-01-02 | End: 2024-01-05

## 2024-01-02 RX ORDER — POLYETHYLENE GLYCOL 3350 17 G/17G
17 POWDER, FOR SOLUTION ORAL DAILY PRN
Status: DISCONTINUED | OUTPATIENT
Start: 2024-01-02 | End: 2024-01-05 | Stop reason: HOSPADM

## 2024-01-02 RX ADMIN — MAGNESIUM SULFATE HEPTAHYDRATE 2 G: 2 INJECTION, SOLUTION INTRAVENOUS at 04:26

## 2024-01-02 RX ADMIN — ENOXAPARIN SODIUM 40 MG: 40 INJECTION SUBCUTANEOUS at 08:16

## 2024-01-02 RX ADMIN — TRAZODONE HYDROCHLORIDE 50 MG: 50 TABLET ORAL at 20:52

## 2024-01-02 RX ADMIN — LOSARTAN POTASSIUM 25 MG: 25 TABLET, FILM COATED ORAL at 08:16

## 2024-01-02 RX ADMIN — CEFTRIAXONE SODIUM 2 G: 2 INJECTION, SOLUTION INTRAVENOUS at 11:38

## 2024-01-02 RX ADMIN — ONDANSETRON 4 MG: 2 INJECTION INTRAMUSCULAR; INTRAVENOUS at 03:04

## 2024-01-02 RX ADMIN — IOHEXOL 50 ML: 350 INJECTION, SOLUTION INTRAVENOUS at 09:31

## 2024-01-02 RX ADMIN — ASPIRIN 81 MG: 81 TABLET, COATED ORAL at 08:16

## 2024-01-02 RX ADMIN — DOXYCYCLINE HYCLATE 100 MG: 100 CAPSULE ORAL at 20:52

## 2024-01-02 RX ADMIN — ACETAMINOPHEN 650 MG: 325 TABLET ORAL at 10:02

## 2024-01-02 RX ADMIN — Medication 400 MG: at 06:31

## 2024-01-02 RX ADMIN — DEXAMETHASONE 6 MG: 6 TABLET ORAL at 08:16

## 2024-01-02 RX ADMIN — SODIUM CHLORIDE, POTASSIUM CHLORIDE, SODIUM LACTATE AND CALCIUM CHLORIDE 1000 ML: 600; 310; 30; 20 INJECTION, SOLUTION INTRAVENOUS at 03:04

## 2024-01-02 RX ADMIN — ATORVASTATIN CALCIUM 40 MG: 40 TABLET, FILM COATED ORAL at 20:52

## 2024-01-02 RX ADMIN — Medication 200 MG: at 06:08

## 2024-01-02 RX ADMIN — LEVOTHYROXINE SODIUM 112 MCG: 0.11 TABLET ORAL at 08:16

## 2024-01-02 RX ADMIN — POTASSIUM CHLORIDE 40 MEQ: 1.5 POWDER, FOR SOLUTION ORAL at 04:26

## 2024-01-02 RX ADMIN — DOXYCYCLINE HYCLATE 100 MG: 100 CAPSULE ORAL at 11:38

## 2024-01-02 RX ADMIN — POTASSIUM CHLORIDE 40 MEQ: 1500 TABLET, EXTENDED RELEASE ORAL at 06:31

## 2024-01-02 RX ADMIN — ACETAMINOPHEN 650 MG: 325 TABLET ORAL at 03:38

## 2024-01-02 SDOH — SOCIAL STABILITY: SOCIAL INSECURITY: ARE YOU OR HAVE YOU BEEN THREATENED OR ABUSED PHYSICALLY, EMOTIONALLY, OR SEXUALLY BY ANYONE?: NO

## 2024-01-02 SDOH — SOCIAL STABILITY: SOCIAL INSECURITY: DO YOU FEEL ANYONE HAS EXPLOITED OR TAKEN ADVANTAGE OF YOU FINANCIALLY OR OF YOUR PERSONAL PROPERTY?: NO

## 2024-01-02 SDOH — SOCIAL STABILITY: SOCIAL INSECURITY: DO YOU FEEL UNSAFE GOING BACK TO THE PLACE WHERE YOU ARE LIVING?: NO

## 2024-01-02 SDOH — SOCIAL STABILITY: SOCIAL INSECURITY: HAVE YOU HAD THOUGHTS OF HARMING ANYONE ELSE?: NO

## 2024-01-02 SDOH — SOCIAL STABILITY: SOCIAL INSECURITY: DOES ANYONE TRY TO KEEP YOU FROM HAVING/CONTACTING OTHER FRIENDS OR DOING THINGS OUTSIDE YOUR HOME?: NO

## 2024-01-02 SDOH — SOCIAL STABILITY: SOCIAL INSECURITY: WERE YOU ABLE TO COMPLETE ALL THE BEHAVIORAL HEALTH SCREENINGS?: NO

## 2024-01-02 SDOH — SOCIAL STABILITY: SOCIAL INSECURITY: ABUSE: ADULT

## 2024-01-02 SDOH — SOCIAL STABILITY: SOCIAL INSECURITY: HAS ANYONE EVER THREATENED TO HURT YOUR FAMILY OR YOUR PETS?: NO

## 2024-01-02 ASSESSMENT — ACTIVITIES OF DAILY LIVING (ADL)
ADEQUATE_TO_COMPLETE_ADL: YES
DRESSING YOURSELF: INDEPENDENT
LACK_OF_TRANSPORTATION: NO
WALKS IN HOME: INDEPENDENT
BATHING: INDEPENDENT
LACK_OF_TRANSPORTATION: NO
HEARING - RIGHT EAR: FUNCTIONAL
GROOMING: INDEPENDENT
PATIENT'S MEMORY ADEQUATE TO SAFELY COMPLETE DAILY ACTIVITIES?: YES
HEARING - LEFT EAR: FUNCTIONAL
TOILETING: INDEPENDENT
JUDGMENT_ADEQUATE_SAFELY_COMPLETE_DAILY_ACTIVITIES: YES
FEEDING YOURSELF: INDEPENDENT

## 2024-01-02 ASSESSMENT — COGNITIVE AND FUNCTIONAL STATUS - GENERAL
TOILETING: A LITTLE
WALKING IN HOSPITAL ROOM: A LITTLE
PATIENT BASELINE BEDBOUND: NO
MOBILITY SCORE: 20
DRESSING REGULAR LOWER BODY CLOTHING: A LITTLE
MOVING TO AND FROM BED TO CHAIR: A LITTLE
CLIMB 3 TO 5 STEPS WITH RAILING: A LITTLE
STANDING UP FROM CHAIR USING ARMS: A LITTLE
DAILY ACTIVITIY SCORE: 22

## 2024-01-02 ASSESSMENT — PAIN - FUNCTIONAL ASSESSMENT
PAIN_FUNCTIONAL_ASSESSMENT: 0-10

## 2024-01-02 ASSESSMENT — LIFESTYLE VARIABLES
HAVE YOU EVER FELT YOU SHOULD CUT DOWN ON YOUR DRINKING: NO
AUDIT-C TOTAL SCORE: 4
EVER HAD A DRINK FIRST THING IN THE MORNING TO STEADY YOUR NERVES TO GET RID OF A HANGOVER: NO
AUDIT-C TOTAL SCORE: 4
HOW OFTEN DO YOU HAVE A DRINK CONTAINING ALCOHOL: 4 OR MORE TIMES A WEEK
EVER FELT BAD OR GUILTY ABOUT YOUR DRINKING: NO
SKIP TO QUESTIONS 9-10: 1
REASON UNABLE TO ASSESS: NO
HOW OFTEN DO YOU HAVE 6 OR MORE DRINKS ON ONE OCCASION: NEVER
HOW MANY STANDARD DRINKS CONTAINING ALCOHOL DO YOU HAVE ON A TYPICAL DAY: 1 OR 2
HAVE PEOPLE ANNOYED YOU BY CRITICIZING YOUR DRINKING: NO

## 2024-01-02 ASSESSMENT — PATIENT HEALTH QUESTIONNAIRE - PHQ9
SUM OF ALL RESPONSES TO PHQ9 QUESTIONS 1 & 2: 0
2. FEELING DOWN, DEPRESSED OR HOPELESS: NOT AT ALL
1. LITTLE INTEREST OR PLEASURE IN DOING THINGS: NOT AT ALL

## 2024-01-02 ASSESSMENT — PAIN SCALES - GENERAL
PAINLEVEL_OUTOF10: 0 - NO PAIN
PAINLEVEL_OUTOF10: 7
PAINLEVEL_OUTOF10: 10 - WORST POSSIBLE PAIN
PAINLEVEL_OUTOF10: 0 - NO PAIN

## 2024-01-02 ASSESSMENT — PAIN DESCRIPTION - LOCATION
LOCATION: HEAD
LOCATION: HEAD

## 2024-01-02 ASSESSMENT — PAIN DESCRIPTION - PAIN TYPE: TYPE: ACUTE PAIN

## 2024-01-02 ASSESSMENT — COLUMBIA-SUICIDE SEVERITY RATING SCALE - C-SSRS
2. HAVE YOU ACTUALLY HAD ANY THOUGHTS OF KILLING YOURSELF?: NO
6. HAVE YOU EVER DONE ANYTHING, STARTED TO DO ANYTHING, OR PREPARED TO DO ANYTHING TO END YOUR LIFE?: NO
1. IN THE PAST MONTH, HAVE YOU WISHED YOU WERE DEAD OR WISHED YOU COULD GO TO SLEEP AND NOT WAKE UP?: NO

## 2024-01-02 NOTE — NURSING NOTE
Pt arrived to floor at 1540, on oximizer at 8L vitals stable and no SOB, pain, n/v.   Provided pt with incentive spirometer and instruction , turned O2 down to 6L on oximizer with no decrease in SPO2 (96%)

## 2024-01-02 NOTE — PROGRESS NOTES
Occupational Therapy                 Therapy Communication Note    Patient Name: Mallory Meyer  MRN: [id number]  Today's Date: 1/2/2024     Discipline: Occupational Therapy    Missed Visit Reason: Spoke with pt's RN, and pt. With compromised breathing secondary to Covid. Pt not medically appropriate for OT eval at this time.     Missed Time: Attempt

## 2024-01-02 NOTE — PROGRESS NOTES
Physical Therapy                 Therapy Communication Note    Patient Name: Mallory Meyer  MRN: [id number]  Today's Date: 1/2/2024     Discipline: Physical Therapy    Missed Time: Attempt    Comment:  PT orders received, chart reviewed.  Per RN, pt with increased O2 needs at rest.  Will hold PT eval at this time and re-attempt as appropriate.

## 2024-01-02 NOTE — H&P
History Of Present Illness  Mallory Meyer is a 83 y.o. female with significant medical history of history of CVA, atrial arrhythmia, mitral regurgitation, hypothyroidism, hypertension, dyslipidemia, left bundle branch block presenting with nausea, vomiting, diarrhea, cough, headache.  Symptoms began 1 day ago with nausea, 6 episodes of NBNB vomit, diarrhea, body aches, subjective fever, headache, generalized weakness, nonproductive cough.  She denies chest pain, shortness of breath, recent travel, sick contacts, abdominal pain, melena, hematochezia, hematuria, dysuria, polyuria, lower leg swelling.  She is coming from home, is able to participate in all of her ADLs independently.     PMH: As above  PSH: Denies  FMH: Reviewed and noncontributory  SocHx: 1 alcoholic drink per day, denies tobacco and drug use  Allergies: Penicillin and sulfa: Develops hives  CODE STATUS: Full code    ED course:  Initial Vitals: Afebrile 36.6 temperature, pulse 81, respiratory rate 20, /84, satting 93% on room air  Labs: CMP unremarkable, lipase 8, troponin 48 stable with slight uptrend to 49, CBC shows white count 8.4, hemoglobin 11.2, platelets 153, flu negative, COVID-positive  Imaging: Chest x-ray shows prominence of basilar opacities that is asymmetric in the left lung.  EKG:Sinus rhythm with ventricular rate 98, GA interval 176, , QTc 510  Interventions: She was administered 650 mg of Tylenol, 1 L LR bolus, 4 mg Zofran injection      Past Medical History  Past Medical History:   Diagnosis Date    Acute embolism and thrombosis of unspecified deep veins of right lower extremity (CMS/[facility]) 12/28/2016    Deep vein blood clot of right lower extremity    Acute respiratory failure with hypoxia (CMS/[facility]) 02/02/2019    Acute hypoxemic respiratory failure    CVA (cerebral vascular accident) (CMS/[facility]) 08/08/2022    Diverticulosis of intestine, part unspecified, without perforation or abscess without bleeding 12/08/2013     Diverticulosis    Other conditions influencing health status     No significant past medical history    Other general symptoms and signs 02/05/2018    Flu-like symptoms    Personal history of diseases of the blood and blood-forming organs and certain disorders involving the immune mechanism 01/10/2019    History of anemia    Personal history of diseases of the blood and blood-forming organs and certain disorders involving the immune mechanism 02/02/2019    History of leukocytosis    Personal history of other drug therapy 08/23/2017    History of influenza vaccination    Personal history of other medical treatment 08/17/2020    History of screening mammography    Phlebitis and thrombophlebitis of lower extremities, unspecified 12/28/2016    Phlebitis and thrombophlebitis of lower extremities    Syncope and collapse 12/29/2017    Near syncope       Surgical History  Past Surgical History:   Procedure Laterality Date    OTHER SURGICAL HISTORY  06/27/2022    Colonoscopy        Social History  She reports that she has never smoked. She has never used smokeless tobacco. No history on file for alcohol use and drug use.    Family History  Family History   Problem Relation Name Age of Onset    Breast cancer Mother      Lung cancer Mother      Other (malignant neoplasm of breast) Mother      Hyperlipidemia Father      Hyperlipidemia Brother      Other (malignant neoplasm of prostate) Son          Allergies  Penicillins and Sulfamethoxazole    12-system ROS negative except as documented in HPI     Physical Exam  Vitals reviewed.   Constitutional:       General: She is not in acute distress.     Appearance: Normal appearance. She is normal weight. She is not ill-appearing, toxic-appearing or diaphoretic.   HENT:      Head: Normocephalic and atraumatic.      Right Ear: External ear normal.      Left Ear: External ear normal.      Nose: Nose normal.      Mouth/Throat:      Mouth: Mucous membranes are moist.      Pharynx:  "Oropharynx is clear.   Eyes:      Extraocular Movements: Extraocular movements intact.      Pupils: Pupils are equal, round, and reactive to light.   Cardiovascular:      Rate and Rhythm: Normal rate and regular rhythm.      Pulses: Normal pulses.      Heart sounds: Murmur heard.      No friction rub. No gallop.   Pulmonary:      Effort: Pulmonary effort is normal. No respiratory distress.      Breath sounds: Normal breath sounds. No wheezing, rhonchi or rales.   Abdominal:      General: Abdomen is flat. Bowel sounds are normal. There is no distension.      Palpations: Abdomen is soft. There is no mass.      Tenderness: There is no abdominal tenderness.      Hernia: No hernia is present.   Musculoskeletal:         General: No swelling, tenderness, deformity or signs of injury. Normal range of motion.      Cervical back: Normal range of motion and neck supple.      Right lower leg: No edema.      Left lower leg: No edema.   Skin:     General: Skin is warm and dry.      Capillary Refill: Capillary refill takes less than 2 seconds.   Neurological:      General: No focal deficit present.      Mental Status: She is alert and oriented to person, place, and time. Mental status is at baseline.   Psychiatric:         Mood and Affect: Mood normal.         Behavior: Behavior normal.       Last Recorded Vitals  Blood pressure 162/72, pulse 98, temperature 37.2 °C (99 °F), temperature source Temporal, resp. rate 24, height 1.651 m (5' 5\"), weight 61.2 kg (135 lb), SpO2 91 %.    Relevant Results  Results for orders placed or performed during the hospital encounter of 01/02/24 (from the past 24 hour(s))   Sars-CoV-2 RT PCR, Symptomatic   Result Value Ref Range    Coronavirus 2019, PCR Detected (A) Not Detected   Influenza A, and B PCR   Result Value Ref Range    Flu A Result Not Detected Not Detected    Flu B Result Not Detected Not Detected   CBC and Auto Differential   Result Value Ref Range    WBC 8.4 4.4 - 11.3 x10*3/uL    " nRBC 0.0 0.0 - 0.0 /100 WBCs    RBC 3.84 (L) 4.00 - 5.20 x10*6/uL    Hemoglobin 11.2 (L) 12.0 - 16.0 g/dL    Hematocrit 34.6 (L) 36.0 - 46.0 %    MCV 90 80 - 100 fL    MCH 29.2 26.0 - 34.0 pg    MCHC 32.4 32.0 - 36.0 g/dL    RDW 12.9 11.5 - 14.5 %    Platelets 153 150 - 450 x10*3/uL    Neutrophils % 85.8 40.0 - 80.0 %    Immature Granulocytes %, Automated 0.2 0.0 - 0.9 %    Lymphocytes % 5.7 13.0 - 44.0 %    Monocytes % 8.2 2.0 - 10.0 %    Eosinophils % 0.1 0.0 - 6.0 %    Basophils % 0.0 0.0 - 2.0 %    Neutrophils Absolute 7.18 (H) 1.60 - 5.50 x10*3/uL    Immature Granulocytes Absolute, Automated 0.02 0.00 - 0.50 x10*3/uL    Lymphocytes Absolute 0.48 (L) 0.80 - 3.00 x10*3/uL    Monocytes Absolute 0.69 0.05 - 0.80 x10*3/uL    Eosinophils Absolute 0.01 0.00 - 0.40 x10*3/uL    Basophils Absolute 0.00 0.00 - 0.10 x10*3/uL   Comprehensive Metabolic Panel   Result Value Ref Range    Glucose 125 (H) 74 - 99 mg/dL    Sodium 138 136 - 145 mmol/L    Potassium 3.6 3.5 - 5.3 mmol/L    Chloride 101 98 - 107 mmol/L    Bicarbonate 27 21 - 32 mmol/L    Anion Gap 14 10 - 20 mmol/L    Urea Nitrogen 10 6 - 23 mg/dL    Creatinine 0.58 0.50 - 1.05 mg/dL    eGFR >90 >60 mL/min/1.73m*2    Calcium 8.7 8.6 - 10.3 mg/dL    Albumin 3.9 3.4 - 5.0 g/dL    Alkaline Phosphatase 73 33 - 136 U/L    Total Protein 6.4 6.4 - 8.2 g/dL    AST 20 9 - 39 U/L    Bilirubin, Total 0.8 0.0 - 1.2 mg/dL    ALT 14 7 - 45 U/L   Magnesium   Result Value Ref Range    Magnesium 1.74 1.60 - 2.40 mg/dL   Lipase   Result Value Ref Range    Lipase 8 (L) 9 - 82 U/L   Troponin I, High Sensitivity, Initial   Result Value Ref Range    Troponin I, High Sensitivity 48 (H) 0 - 13 ng/L     XR chest 1 view    Result Date: 1/2/2024  Interpreted By:  Kian Velázquez, STUDY: XR CHEST 1 VIEW;  1/2/2024 2:31 am   INDICATION: Signs/Symptoms:cough,body aches, fever, chills, N/V/D.   COMPARISON: 11/21/2022   ACCESSION NUMBER(S): [id number]   ORDERING CLINICIAN: DOMINIC MORGAN    FINDINGS: The cardiac silhouette is normal in size. There is prominence of bibasilar opacities which is asymmetric in the left lung and concerning for infectious process. No significant pleural effusion. No pneumothorax.       Prominence of basilar opacities which is asymmetric in the left lung and concerning for pneumonia.   MACRO: None   Signed by: Kian Velázquez 1/2/2024 2:36 AM Dictation workstation:   [id number]    Scheduled medications    Continuous medications    PRN medications     Assessment/Plan   Principal Problem:    Acute hypoxemic respiratory failure due to COVID-19 (CMS/[facility])      83-year-old female significant medical history of history of CVA, atrial arrhythmia, mitral regurgitation, hypothyroidism, hypertension, dyslipidemia, left bundle branch block presenting with nausea, vomiting, diarrhea, cough, headache due to COVID-19 infection.  Patient will be admitted for further workup and monitoring of her acute hypoxemic respiratory failure secondary to COVID-19 infection.    # Acute hypoxemic respiratory failure  # COVID-19 infection  # Generalized weakness  # Elevated troponin  # Left bundle branch block  # QT prolongation  -Will initiate remdesivir and Decadron per COVID-19 protocol  -Will treat patient's symptoms supportively, scopolamine patch, benzocaine lozenge, Robitussin DM, Mucinex tablet, Tylenol every 4 hours as needed  - PT/OT on consult, appreciate recs  - Patient presented with tachycardia and hypoxia desatting down to 85% while I was at the bedside and this is likely due to her COVID-19 infection, but she is not on any anticoagulation, and has a history of a DVT 6 years ago, D-dimer elevated even after age adjustment, will obtain CT PE to assess further.  - Will monitor on telemetry given QT prolongation on EKG  - Elevated troponin likely due to demand ischemia in the setting of hypoxia    # History of CVA  # Atrial arrhythmia  # Mitral regurgitation  # Hypothyroidism  # Hypertension  #  Dyslipidemia  -Continue home meds pending med rec      DIET: Cardiac  DVT PPX: lovenox  ABX: none  CODE STATUS: full code   Disposition: pending pt/ot eval      To be discussed with attending physician,  Ej Holt D.O.  PGY-1 Internal medicine resident      ------------------------------  Senior resident addendum    Mrs. Meyer is an 83-year-old with PMH hypertension, hyperlipidemia, hypothyroidism, nonischemic cardiomyopathy, HFpEF (EF 45-50% in 08/22), osteoporosis who presented to the ED with nausea, vomiting, cough and diarrhea for the last day.    In the ED, she was vitally stable apart from hypoxia with high 80s, improved with oxygen to 96%. She was found to have COVID positive but influenza AMB negative.  CBC and CMP were within normal limits apart from normocytic anemia hemoglobin of 11.2 with mild left shift neutrophil of 7.18K.  Troponin mildly elevated 2X (48 and 49) likely secondary to type II MI.    EKG was normal sinus with left bundle branch block.  QTc 510 ms.  She is admitted as a case of acute hypoxic respiratory failure 2/2 COVID infection.  Will start remdesivir and Decadron along with symptomatic management of pain, fever, nausea/vomiting. Due to her prolonged QT, will order scopolamine patch for nausea/vomiting.  Here Well's score for PE is 3 (1.5 for tachycardia and 1.5 for history of DVT), her age-adjusted D-dimer is 810 so slight elevated above the cutoff (500).  Unlikely (<4) to have PE as COVID is a well-known cause for tachycardia, hypoxia and elevated D-dimer.  But that can be considered if no improvement in her symptoms within the next 48-72 hours.    Rest as above     I saw and examined the patient independently of the intern.  Plan was discussed with the intern.  Ze Deng MD   Internal Medicine, PGY II

## 2024-01-02 NOTE — ED PROVIDER NOTES
HPI   Chief Complaint   Patient presents with    Vomiting     N/V/D, weakness, cough, headache x 18 hours       83-year-old female present to the [facility] part for evaluation of approximately 18 hours of nausea vomiting, diarrhea, cough, body aches and intermittent headache.  Denies any recent travel or known sick contacts.  Denies any chest pain or shortness of breath.  She endorses subjective fevers but is not taken her temperature at home.  She does not take anything prior to arrival for supportive care.  Called 911 for further assessment.  Denies any actual abdominal pain, dysuria, hematuria, increased urinary urgency or frequency.  She denies any melena or hematochezia or hematemesis.  Denies use of anticoagulation.  Endorses history of hypertension, diverticulosis, thyroid disorder.  She denies any peripheral edema or calf pain.  No recent prolonged immobilization or surgeries.  No use of estrogen products.                          Dilia Coma Scale Score: 15                  Patient History   Past Medical History:   Diagnosis Date    Acute embolism and thrombosis of unspecified deep veins of right lower extremity (CMS/[facility]) 12/28/2016    Deep vein blood clot of right lower extremity    Acute respiratory failure with hypoxia (CMS/[facility]) 02/02/2019    Acute hypoxemic respiratory failure    CVA (cerebral vascular accident) (CMS/[facility]) 08/08/2022    Diverticulosis of intestine, part unspecified, without perforation or abscess without bleeding 12/08/2013    Diverticulosis    Other conditions influencing health status     No significant past medical history    Other general symptoms and signs 02/05/2018    Flu-like symptoms    Personal history of diseases of the blood and blood-forming organs and certain disorders involving the immune mechanism 01/10/2019    History of anemia    Personal history of diseases of the blood and blood-forming organs and certain disorders involving the immune mechanism 02/02/2019    History  of leukocytosis    Personal history of other drug therapy 08/23/2017    History of influenza vaccination    Personal history of other medical treatment 08/17/2020    History of screening mammography    Phlebitis and thrombophlebitis of lower extremities, unspecified 12/28/2016    Phlebitis and thrombophlebitis of lower extremities    Syncope and collapse 12/29/2017    Near syncope     Past Surgical History:   Procedure Laterality Date    OTHER SURGICAL HISTORY  06/27/2022    Colonoscopy     Family History   Problem Relation Name Age of Onset    Breast cancer Mother      Lung cancer Mother      Other (malignant neoplasm of breast) Mother      Hyperlipidemia Father      Hyperlipidemia Brother      Other (malignant neoplasm of prostate) Son       Social History     Tobacco Use    Smoking status: Never    Smokeless tobacco: Never   Substance Use Topics    Alcohol use: Not on file    Drug use: Not on file       Physical Exam   ED Triage Vitals [01/02/24 0129]   Temp Heart Rate Resp BP   36.6 °C (97.9 °F) 81 20 179/84      SpO2 Temp Source Heart Rate Source Patient Position   93 % Temporal Monitor Sitting      BP Location FiO2 (%)     Right arm --       Physical Exam  Vitals and nursing note reviewed.   Constitutional:       General: She is not in acute distress.     Appearance: She is well-developed. She is ill-appearing. She is not toxic-appearing.   HENT:      Head: Normocephalic and atraumatic.      Nose: No congestion or rhinorrhea.      Mouth/Throat:      Mouth: Mucous membranes are moist.      Pharynx: No oropharyngeal exudate or posterior oropharyngeal erythema.   Eyes:      Conjunctiva/sclera: Conjunctivae normal.   Cardiovascular:      Rate and Rhythm: Normal rate and regular rhythm.      Pulses: Normal pulses.      Heart sounds: No murmur heard.     No gallop.   Pulmonary:      Effort: Pulmonary effort is normal. No respiratory distress.      Breath sounds: Normal breath sounds. No stridor. No wheezing, rhonchi  or rales.   Abdominal:      General: Bowel sounds are normal. There is no distension.      Palpations: Abdomen is soft.      Tenderness: There is no abdominal tenderness. There is no guarding or rebound.   Musculoskeletal:         General: No swelling.      Cervical back: Neck supple.   Skin:     General: Skin is warm and dry.      Capillary Refill: Capillary refill takes less than 2 seconds.      Findings: No rash.   Neurological:      General: No focal deficit present.      Mental Status: She is alert and oriented to person, place, and time.      Cranial Nerves: No cranial nerve deficit.      Sensory: No sensory deficit.      Gait: Gait normal.   Psychiatric:         Mood and Affect: Mood normal.         Behavior: Behavior normal.         Thought Content: Thought content normal.         ED Course & MDM   ED Course as of 01/02/24 0503   Tue Jan 02, 2024 0227 Patient tested positive for COVID-19 which I believe is the likely cause of her symptomatology. [TL]   0246 Mild elevation of troponin.  Patient has no acute shortness of breath or chest pain.  Will send for repeat at this time. [TL]   0320 Viral pneumonia noted on chest x-ray.  No leukocytosis.  Minimal elevation of troponin noted but on repeat assessment patient has no chest pain or shortness of breath. [TL]   0337 Patient with type II NSTEMI and heart rate remains approximately 99 bpm at this time on telemetry.  Tylenol given for body aches and headache.  Patient currently saturating 91% on room air.  Will admit patient for observation status given elderly state, type II NSTEMI in the setting of COVID-19, nausea, vomiting and borderline hypoxia. [TL]      ED Course User Index  [TL] Jose De Jesus Reeder DO         Diagnoses as of 01/02/24 0503   COVID-19 viremia   Nausea vomiting and diarrhea       Medical Decision Making  83-year-old female who appears to be ill but nontoxic with likely viral syndrome.  She endorses subjective fevers, body aches, nausea,  vomiting, diarrhea, cough over the last day and a half.  Abdomen is soft nontender and I do not suspect surgical emergency at this time.  COVID, influenza testing to be sent.  Patient be given supportive care in the form of Zofran and IV fluids.  Based laboratory studies and cardiac workup to be obtained given patient's elderly status with cardiovascular comorbidity.  No sign of PE or DVT on examination.  Will assess urinalysis given patient elderly status and generally feeling unwell with subjective fever.  No sign of thyrotoxicosis or myxedema coma.  No neurologic decompensation or sign of acute toxicologic abnormality.        Procedure  Procedures     Jose De Jesus Reeder DO  01/02/24 0506

## 2024-01-03 PROBLEM — U07.1 COVID-19 VIREMIA: Status: ACTIVE | Noted: 2024-01-03

## 2024-01-03 LAB
ALBUMIN SERPL BCP-MCNC: 3.3 G/DL (ref 3.4–5)
ALP SERPL-CCNC: 70 U/L (ref 33–136)
ALT SERPL W P-5'-P-CCNC: 20 U/L (ref 7–45)
ANION GAP SERPL CALC-SCNC: 12 MMOL/L (ref 10–20)
AST SERPL W P-5'-P-CCNC: 29 U/L (ref 9–39)
BASOPHILS # BLD AUTO: 0.01 X10*3/UL (ref 0–0.1)
BASOPHILS NFR BLD AUTO: 0.1 %
BILIRUB SERPL-MCNC: 0.5 MG/DL (ref 0–1.2)
BUN SERPL-MCNC: 16 MG/DL (ref 6–23)
CALCIUM SERPL-MCNC: 8.2 MG/DL (ref 8.6–10.3)
CHLORIDE SERPL-SCNC: 102 MMOL/L (ref 98–107)
CO2 SERPL-SCNC: 27 MMOL/L (ref 21–32)
CREAT SERPL-MCNC: 0.58 MG/DL (ref 0.5–1.05)
EOSINOPHIL # BLD AUTO: 0 X10*3/UL (ref 0–0.4)
EOSINOPHIL NFR BLD AUTO: 0 %
ERYTHROCYTE [DISTWIDTH] IN BLOOD BY AUTOMATED COUNT: 13 % (ref 11.5–14.5)
GFR SERPL CREATININE-BSD FRML MDRD: >90 ML/MIN/1.73M*2
GLUCOSE SERPL-MCNC: 106 MG/DL (ref 74–99)
HCT VFR BLD AUTO: 31.5 % (ref 36–46)
HGB BLD-MCNC: 10.2 G/DL (ref 12–16)
IMM GRANULOCYTES # BLD AUTO: 0.03 X10*3/UL (ref 0–0.5)
IMM GRANULOCYTES NFR BLD AUTO: 0.4 % (ref 0–0.9)
LYMPHOCYTES # BLD AUTO: 1.18 X10*3/UL (ref 0.8–3)
LYMPHOCYTES NFR BLD AUTO: 14.4 %
MCH RBC QN AUTO: 29.2 PG (ref 26–34)
MCHC RBC AUTO-ENTMCNC: 32.4 G/DL (ref 32–36)
MCV RBC AUTO: 90 FL (ref 80–100)
MONOCYTES # BLD AUTO: 0.58 X10*3/UL (ref 0.05–0.8)
MONOCYTES NFR BLD AUTO: 7.1 %
NEUTROPHILS # BLD AUTO: 6.38 X10*3/UL (ref 1.6–5.5)
NEUTROPHILS NFR BLD AUTO: 78 %
NRBC BLD-RTO: 0 /100 WBCS (ref 0–0)
PHOSPHATE SERPL-MCNC: 2.7 MG/DL (ref 2.5–4.9)
PLATELET # BLD AUTO: 154 X10*3/UL (ref 150–450)
POTASSIUM SERPL-SCNC: 4 MMOL/L (ref 3.5–5.3)
PROT SERPL-MCNC: 5.6 G/DL (ref 6.4–8.2)
RBC # BLD AUTO: 3.49 X10*6/UL (ref 4–5.2)
SODIUM SERPL-SCNC: 137 MMOL/L (ref 136–145)
WBC # BLD AUTO: 8.2 X10*3/UL (ref 4.4–11.3)

## 2024-01-03 PROCEDURE — 2500000004 HC RX 250 GENERAL PHARMACY W/ HCPCS (ALT 636 FOR OP/ED)

## 2024-01-03 PROCEDURE — 99233 SBSQ HOSP IP/OBS HIGH 50: CPT

## 2024-01-03 PROCEDURE — 97165 OT EVAL LOW COMPLEX 30 MIN: CPT | Mod: GO | Performed by: OCCUPATIONAL THERAPIST

## 2024-01-03 PROCEDURE — 2500000001 HC RX 250 WO HCPCS SELF ADMINISTERED DRUGS (ALT 637 FOR MEDICARE OP)

## 2024-01-03 PROCEDURE — 80053 COMPREHEN METABOLIC PANEL: CPT

## 2024-01-03 PROCEDURE — 97161 PT EVAL LOW COMPLEX 20 MIN: CPT | Mod: GP | Performed by: PHYSICAL THERAPIST

## 2024-01-03 PROCEDURE — 96372 THER/PROPH/DIAG INJ SC/IM: CPT

## 2024-01-03 PROCEDURE — 1200000002 HC GENERAL ROOM WITH TELEMETRY DAILY

## 2024-01-03 PROCEDURE — 84100 ASSAY OF PHOSPHORUS: CPT

## 2024-01-03 PROCEDURE — 36415 COLL VENOUS BLD VENIPUNCTURE: CPT

## 2024-01-03 PROCEDURE — 85025 COMPLETE CBC W/AUTO DIFF WBC: CPT

## 2024-01-03 RX ADMIN — ENOXAPARIN SODIUM 40 MG: 40 INJECTION SUBCUTANEOUS at 09:48

## 2024-01-03 RX ADMIN — DEXAMETHASONE 6 MG: 6 TABLET ORAL at 09:47

## 2024-01-03 RX ADMIN — TRAZODONE HYDROCHLORIDE 50 MG: 50 TABLET ORAL at 21:02

## 2024-01-03 RX ADMIN — ASPIRIN 81 MG: 81 TABLET, COATED ORAL at 09:47

## 2024-01-03 RX ADMIN — LOSARTAN POTASSIUM 25 MG: 25 TABLET, FILM COATED ORAL at 09:47

## 2024-01-03 RX ADMIN — REMDESIVIR 100 MG: 100 INJECTION, POWDER, LYOPHILIZED, FOR SOLUTION INTRAVENOUS at 13:44

## 2024-01-03 RX ADMIN — LEVOTHYROXINE SODIUM 112 MCG: 0.11 TABLET ORAL at 09:47

## 2024-01-03 RX ADMIN — ATORVASTATIN CALCIUM 40 MG: 40 TABLET, FILM COATED ORAL at 21:02

## 2024-01-03 ASSESSMENT — COGNITIVE AND FUNCTIONAL STATUS - GENERAL
PERSONAL GROOMING: A LITTLE
HELP NEEDED FOR BATHING: A LITTLE
DAILY ACTIVITIY SCORE: 19
MOBILITY SCORE: 22
DAILY ACTIVITIY SCORE: 24
CLIMB 3 TO 5 STEPS WITH RAILING: A LITTLE
WALKING IN HOSPITAL ROOM: A LITTLE
TOILETING: A LITTLE
WALKING IN HOSPITAL ROOM: A LITTLE
DAILY ACTIVITIY SCORE: 24
MOBILITY SCORE: 21
STANDING UP FROM CHAIR USING ARMS: A LITTLE
CLIMB 3 TO 5 STEPS WITH RAILING: A LITTLE
DRESSING REGULAR UPPER BODY CLOTHING: A LITTLE
DRESSING REGULAR LOWER BODY CLOTHING: A LITTLE
CLIMB 3 TO 5 STEPS WITH RAILING: A LITTLE
MOBILITY SCORE: 23

## 2024-01-03 ASSESSMENT — PAIN SCALES - GENERAL
PAINLEVEL_OUTOF10: 0 - NO PAIN

## 2024-01-03 ASSESSMENT — PAIN - FUNCTIONAL ASSESSMENT
PAIN_FUNCTIONAL_ASSESSMENT: 0-10

## 2024-01-03 ASSESSMENT — ACTIVITIES OF DAILY LIVING (ADL): ADL_ASSISTANCE: INDEPENDENT

## 2024-01-03 NOTE — PROGRESS NOTES
Physical Therapy    Physical Therapy Evaluation    Patient Name: Mallory Meyer  MRN: [id number]  Today's Date: 1/3/2024   Time Calculation  Start Time: 1506  Stop Time: 1518  Time Calculation (min): 12 min    Assessment/Plan   PT Assessment  PT Assessment Results: Impaired balance  Rehab Prognosis: Good  Evaluation/Treatment Tolerance: Patient tolerated treatment well  Medical Staff Made Aware: Yes  End of Session Communication: Bedside nurse  Assessment Comment: Pt is a 83 y.o. female admitted for Nausea vomiting and diarrhea [R11.2, R19.7]  COVID-19 viremia [U07.1]  Acute hypoxemic respiratory failure due to COVID-19 (CMS/HCC) [U07.1, J96.01] on 1/2/2024. Pt below functional level and will benefit from skilled therapy during stay to improve overall functional mobility, strength, ROM, endurance and safety awareness. Upon discharge pt will likely not require any therapy however therapy will continue to follow and reassess each session.      End of Session Patient Position: Bed, 3 rail up, Alarm on  IP OR SWING BED PT PLAN  Inpatient or Swing Bed: Inpatient  PT Plan  Treatment/Interventions: Gait training, Balance training  PT Plan: Skilled PT  PT Frequency: 3 times per week  PT Discharge Recommendations: No PT needed after discharge  PT Recommended Transfer Status: Stand by assist  PT - OK to Discharge: Yes    Subjective     Current Problem:  Patient Active Problem List   Diagnosis    Vitamin D deficiency    Tinnitus    Senile osteoporosis    Palpitations    Osteopenia    Memory difficulties    Left bundle branch block (LBBB)    Insomnia    Hypothyroid    Hypertension    Hyperlipidemia    Chronic bilateral low back pain    CVA (cerebral vascular accident) (CMS/HCC)    Diverticulosis    Hearing loss, bilateral    Fatigue    Dysuria    SOB (shortness of breath) on exertion    Dysphasia, post-stroke    Cardiac arrhythmia    Cardiomyopathy, nonischemic (CMS/HCC)    Cataract, bilateral    Cerebrovascular accident (CVA)  of left basal ganglia (CMS/HCC)    Bilateral sensorineural hearing loss    Anxiety disorder    Acute UTI    Abnormal ECG    Chronic sinusitis    Closed fracture of shaft of metacarpal bone    Eustachian tube dysfunction    Impaired auditory discrimination    Left arm weakness    Lumbar sprain    Other hypertrophic disorders of the skin    Other melanin hyperpigmentation    Other seborrheic keratosis    Rash and other nonspecific skin eruption    Need for influenza vaccination    Acute hypoxemic respiratory failure due to COVID-19 (CMS/HCC)    COVID-19 viremia       General Visit Information:  General  Reason for Referral: impaired mobility  Referred By: Dr. Gonzalez  Past Medical History Relevant to Rehab: CVA, atrial arrhythmia, mitral regurgitation, hypothyroidism, hypertension, dyslipidemia, left bundle branch block presenting with nausea, vomiting, diarrhea, cough, headache.  Symptoms began 1 day ago with nausea, 6 episodes of NBNB vomit, diarrhea, body aches, subjective fever, headache, generalized weakness, nonproductive cough.  She denies chest pain, shortness of breath, recent travel, sick contacts, abdominal pain, melena, hematochezia, hematuria, dysuria, polyuria, lower leg swelling.  She is coming from home, is able to participate in all of her ADLs independently.  Co-Treatment: OT  Co-Treatment Reason: patient safety  Prior to Session Communication: Bedside nurse  Patient Position Received: Bed, 3 rail up, Alarm on  Preferred Learning Style: verbal  General Comment: Pt agreable to therapy evaluation    Home Living:  Home Living  Type of Home: House  Lives With: Spouse  Home Layout: One level  Home Access: No concerns    Prior Level of Function:  Prior Function Per Pt/Caregiver Report  Level of [place]: Independent with ADLs and functional transfers  ADL Assistance: Independent  Homemaking Assistance: Independent  Meal Prep: Independent    Precautions:  Precautions  Medical Precautions: Fall  precautions  Precautions Comment: Covid precuations- contact droplet plus    Vital Signs:     Objective     Pain:  Pain Assessment  Pain Assessment: 0-10  Pain Score: 0 - No pain    Cognition:  Cognition  Overall Cognitive Status: Within Functional Limits    General Assessments:      Activity Tolerance  Endurance: Endurance does not limit participation in activity                 Static Sitting Balance  Static Sitting-Comment/Number of Minutes: good  Dynamic Sitting Balance  Dynamic Sitting-Comments: good  Static Standing Balance  Static Standing-Comment/Number of Minutes: good  Dynamic Standing Balance  Dynamic Standing-Comments: good    Functional Assessments:  ADL  Toileting Assistance with Device: Stand by  Bed Mobility  Bed Mobility: Yes  Bed Mobility 1  Bed Mobility 1: Supine to sitting, Sitting to supine  Level of Assistance 1: Close supervision  Transfers  Transfer: Yes  Transfer 1  Technique 1: Sit to stand, Stand to sit  Transfer Device 1: Walker  Ambulation/Gait Training  Ambulation/Gait Training Performed: Yes  Ambulation/Gait Training 1  Surface 1: Level tile  Device 1: No device  Assistance 1: Close supervision  Quality of Gait 1:  (WFL)  Comments/Distance (ft) 1: 60          Extremity/Trunk Assessments:        RLE   RLE : Within Functional Limits  LLE   LLE : Within Functional Limits    Outcome Measures:  AMPAC Basic Mobility  Turning from your back to your side while in a flat bed without using bedrails: None  Moving from lying on your back to sitting on the side of a flat bed without using bedrails: None  Moving to and from bed to chair (including a wheelchair): None  Standing up from a chair using your arms (e.g. wheelchair or bedside chair): A little  To walk in hospital room: A little  Climbing 3-5 steps with railing: A little  Basic Mobility - Total Score: 21                            Goals:  Encounter Problems       Encounter Problems (Active)       PT Problem       Pt will ambulate 250 ft  with mod I (Progressing)       Start:  01/03/24    Expected End:  01/17/24            Pt will demonstrate good dynamic standing balance while performing a functional task.   (Progressing)       Start:  01/03/24    Expected End:  01/17/24                       Education Documentation  No documentation found.  Education Comments  No comments found.

## 2024-01-03 NOTE — PROGRESS NOTES
01/03/24 0930   Discharge Planning   Living Arrangements Alone   Support Systems Children;Family members;Friends/neighbors   Assistance Needed none   Type of Residence Private residence   Number of Stairs to Enter Residence 0   Number of Stairs Within Residence 0   Do you have animals or pets at home? No   Who is requesting discharge planning? Provider   Home or Post Acute Services None   Patient expects to be discharged to: home   Does the patient need discharge transport arranged? No     Spoke to patient via phone and verified address. Patient has a new PCP but can not remember the name. Uses mostly mail in pharmacy for meds but will sometimes use [facility] in [place] if needed. Patient uses no ambulation devices, still drives, does all of the grocery shopping and performs all her own ADL's. Patient has a lot of family support and are available when needed. Patient states feels safe at home and will be going home with no needs on discharge. TCC team to follow patient for any further discharge needs.

## 2024-01-03 NOTE — CARE PLAN
Patient remained hds throughout shift. Maintained on 3L NC SpO2 %. NSR on the monitor; HR 90-100s. No fever or pain throughout shift. Safety maintained. Call light within reach.

## 2024-01-03 NOTE — PROGRESS NOTES
Mallory Meyer is a 83 y.o. female on day 1 of admission presenting with Acute hypoxemic respiratory failure due to COVID-19 (CMS/[facility]).      Subjective   Patient was seen and examined this morning.  Patient feels better overall improvement in her shortness of breath and cough.    Overnight events  Patient was transition from high flow nasal cannula to 3 L oxygen via nasal cannula 1/2/2024 evening and is satting well on the same since then.    Interval events: Patient care transitioned to [facility] acute care in the setting of aforementioned oxygen requirements.       Objective     Last Recorded Vitals  /58 (BP Location: Left arm, Patient Position: Lying)   Pulse 85   Temp 36.4 °C (97.5 °F) (Temporal)   Resp 23   Wt 63.8 kg (140 lb 10.5 oz)   SpO2 98%   Intake/Output last 3 Shifts:    Intake/Output Summary (Last 24 hours) at 1/3/2024 1253  Last data filed at 1/2/2024 2200  Gross per 24 hour   Intake 240 ml   Output 200 ml   Net 40 ml       Admission Weight  Weight: 61.2 kg (135 lb) (01/02/24 0129)    Daily Weight  01/03/24 : 63.8 kg (140 lb 10.5 oz)    Constitutional: Well developed,  well appearing adult in no acute distress.   NEURO: Alert and oriented. Moves all extremities. Face is symmetric and expressive. Sensation is intact over all dermatomal distributions of the right upper extremity. Strength is intact  EYES: PERRL. No scleral icterus or conjunctival injection. No discharge.   HENT: Normocephalic, atraumatic. Hearing is grossly intact. Nares grossly patent and without discharge. Mucous membranes moist.   NECK: No JVD. Patient moves neck without restriction.   CARDIO: Rhythm regular. Normal rate. No murmur, rub, or gallop. Pulses equal bilaterally in the upper and lower extremity.   PULM: Bilateral rhonchi heard, improved from admission.  GI/: Abdomen is soft and non-tender. Normoactive bowel sounds.   EXTREMITIES: No clubbing, cyanosis, or deformity. No lower extremity edema. No tenderness along  the deep venous distribution of the bilateral lower extremities  SKIN: Warm and dry. Normal turgor. No rash noted over the area of pain  PSYCH: Mood, affect, and interaction is appropriate to the setting.      Results for orders placed or performed during the hospital encounter of 01/02/24 (from the past 24 hour(s))   Comprehensive metabolic panel   Result Value Ref Range    Glucose 106 (H) 74 - 99 mg/dL    Sodium 137 136 - 145 mmol/L    Potassium 4.0 3.5 - 5.3 mmol/L    Chloride 102 98 - 107 mmol/L    Bicarbonate 27 21 - 32 mmol/L    Anion Gap 12 10 - 20 mmol/L    Urea Nitrogen 16 6 - 23 mg/dL    Creatinine 0.58 0.50 - 1.05 mg/dL    eGFR >90 >60 mL/min/1.73m*2    Calcium 8.2 (L) 8.6 - 10.3 mg/dL    Albumin 3.3 (L) 3.4 - 5.0 g/dL    Alkaline Phosphatase 70 33 - 136 U/L    Total Protein 5.6 (L) 6.4 - 8.2 g/dL    AST 29 9 - 39 U/L    Bilirubin, Total 0.5 0.0 - 1.2 mg/dL    ALT 20 7 - 45 U/L   CBC and Auto Differential   Result Value Ref Range    WBC 8.2 4.4 - 11.3 x10*3/uL    nRBC 0.0 0.0 - 0.0 /100 WBCs    RBC 3.49 (L) 4.00 - 5.20 x10*6/uL    Hemoglobin 10.2 (L) 12.0 - 16.0 g/dL    Hematocrit 31.5 (L) 36.0 - 46.0 %    MCV 90 80 - 100 fL    MCH 29.2 26.0 - 34.0 pg    MCHC 32.4 32.0 - 36.0 g/dL    RDW 13.0 11.5 - 14.5 %    Platelets 154 150 - 450 x10*3/uL    Neutrophils % 78.0 40.0 - 80.0 %    Immature Granulocytes %, Automated 0.4 0.0 - 0.9 %    Lymphocytes % 14.4 13.0 - 44.0 %    Monocytes % 7.1 2.0 - 10.0 %    Eosinophils % 0.0 0.0 - 6.0 %    Basophils % 0.1 0.0 - 2.0 %    Neutrophils Absolute 6.38 (H) 1.60 - 5.50 x10*3/uL    Immature Granulocytes Absolute, Automated 0.03 0.00 - 0.50 x10*3/uL    Lymphocytes Absolute 1.18 0.80 - 3.00 x10*3/uL    Monocytes Absolute 0.58 0.05 - 0.80 x10*3/uL    Eosinophils Absolute 0.00 0.00 - 0.40 x10*3/uL    Basophils Absolute 0.01 0.00 - 0.10 x10*3/uL   Phosphorus   Result Value Ref Range    Phosphorus 2.7 2.5 - 4.9 mg/dL        ECG 12 lead    Result Date: 1/2/2024  Normal sinus  rhythm Left bundle branch block Abnormal ECG When compared with ECG of 02-JAN-2024 01:53, (unconfirmed) Fusion complexes are no longer Present Premature ventricular complexes are no longer Present    ECG 12 lead    Result Date: 1/2/2024  Sinus rhythm and Premature ventricular complexes or Fusion complexes Left bundle branch block Abnormal ECG When compared with ECG of 21-NOV-2022 15:25, Fusion complexes are now Present    CT angio chest for pulmonary embolism    Result Date: 1/2/2024  STUDY: CT Angiogram of the Chest; 1/2/2024, 9:32AM. INDICATION: Elevated d-dimer, hypoxic.  Tachycardic. COMPARISON: CXR same day.  CTA chest 11/21/2022. ACCESSION NUMBER(S): [id number] TECHNIQUE:  CTA of the chest was performed with intravenous contrast. Images are reviewed and processed at a workstation according to the CT angiogram protocol with 3-D and/or MIP post processing imaging generated.  Omnipaque-350, 50 cm3, administered intravenously. Automated mA/kV exposure control was utilized and patient examination was performed in strict accordance with principles of ALARA. FINDINGS: Chest: Topogram grossly negative.  Lungs show bilateral basilar confluent lung markings, left side more than right, likely bilateral basilar subsegmental atelectasis, segmental infectious consolidation less likely.  No lung mass.  There is evidence however in the lungs with a number of small calcified granulomas. Mediastinum: Shows no significant lymphadenopathy, although there are a number of small lymph nodes, likely reactive in nature.  Trachea/esophagus grossly negative. Great Vessels/Heart: Thoracic aorta is normal, without dissection or aneurysm. Pulmonary arteries show no filling defects to suggest pulmonary embolism.  Heart is not enlarged, coronary artery minimal  atherosclerotic disease; no pericardial effusion.   Thoracic aorta without dissection or aneurysm.  Pulmonary arteries show no filling defects.  Heart not enlarged, coronary  arteries with only minimal atherosclerotic changes. Chest wall: No mass. Upper Abdomen: Unremarkable. Skeleton: No vertebral body spondylolisthesis; no vertebral body or rib cage fracture. No osseous lytic/blastic lesions.    1.  Chest shows no evidence of pulmonary artery embolism; thoracic aorta without dissection. 2.  Lungs demonstrate bilateral basilar subsegmental atelectasis, left side more than right side, potentially developing early left lower lobe infectious consolidation? 3.  No overt pulmonary venous congestion, no significant cardiomegaly.  Signed by Issac Weber MD    XR chest 1 view    Result Date: 1/2/2024  Interpreted By:  Kian Velázquez, STUDY: XR CHEST 1 VIEW;  1/2/2024 2:31 am   INDICATION: Signs/Symptoms:cough,body aches, fever, chills, N/V/D.   COMPARISON: 11/21/2022   ACCESSION NUMBER(S): [id number]   ORDERING CLINICIAN: DOMINIC MORGAN   FINDINGS: The cardiac silhouette is normal in size. There is prominence of bibasilar opacities which is asymmetric in the left lung and concerning for infectious process. No significant pleural effusion. No pneumothorax.       Prominence of basilar opacities which is asymmetric in the left lung and concerning for pneumonia.   MACRO: None   Signed by: Kian Velázquez 1/2/2024 2:36 AM Dictation workstation:   [id number]    Image Results  ECG 12 lead  Sinus rhythm and Premature ventricular complexes or Fusion complexes  Left bundle branch block  Abnormal ECG  When compared with ECG of 21-NOV-2022 15:25,  Fusion complexes are now Present  ECG 12 lead  Normal sinus rhythm  Left bundle branch block  Abnormal ECG  When compared with ECG of 02-JAN-2024 01:53, (unconfirmed)  Fusion complexes are no longer Present  Premature ventricular complexes are no longer Present  CT angio chest for pulmonary embolism  Narrative: STUDY:  CT Angiogram of the Chest; 1/2/2024, 9:32AM.  INDICATION:  Elevated d-dimer, hypoxic.  Tachycardic.  COMPARISON:  CXR same day.  CTA chest  11/21/2022.  ACCESSION NUMBER(S):  [id number]  TECHNIQUE:  CTA of the chest was performed with intravenous contrast.   Images are reviewed and processed at a workstation according to the CT  angiogram protocol with 3-D and/or MIP post processing imaging  generated.  Omnipaque-350, 50 cm3, administered intravenously.  Automated mA/kV exposure control was utilized and patient examination  was performed in strict accordance with principles of ALARA.  FINDINGS:   Chest:   Topogram grossly negative.  Lungs show bilateral basilar confluent  lung markings, left side more than right, likely bilateral basilar  subsegmental atelectasis, segmental infectious consolidation less  likely.  No lung mass.  There is evidence however in the lungs with a  number of small calcified granulomas.  Mediastinum:  Shows no significant lymphadenopathy, although there are a number of  small lymph nodes, likely reactive in nature.  Trachea/esophagus  grossly negative.  Great Vessels/Heart:   Thoracic aorta is normal, without dissection or aneurysm. Pulmonary  arteries show no filling defects to suggest pulmonary embolism.  Heart  is not enlarged, coronary artery minimal  atherosclerotic disease; no  pericardial effusion.   Thoracic aorta without dissection or aneurysm.   Pulmonary arteries show no filling defects.  Heart not enlarged,  coronary arteries with only minimal atherosclerotic changes.  Chest wall:  No mass.  Upper Abdomen:  Unremarkable.  Skeleton:   No vertebral body spondylolisthesis; no vertebral body or rib cage  fracture. No osseous lytic/blastic lesions.  Impression: 1.  Chest shows no evidence of pulmonary artery embolism; thoracic  aorta without dissection.  2.  Lungs demonstrate bilateral basilar subsegmental atelectasis, left  side more than right side, potentially developing early left lower  lobe infectious consolidation?  3.  No overt pulmonary venous congestion, no significant cardiomegaly.     Signed by Issac CALVIN  MD Gus  XR chest 1 view  Narrative: Interpreted By:  Kian Velázquez,   STUDY:  XR CHEST 1 VIEW;  1/2/2024 2:31 am      INDICATION:  Signs/Symptoms:cough,body aches, fever, chills, N/V/D.      COMPARISON:  11/21/2022      ACCESSION NUMBER(S):  [id number]      ORDERING CLINICIAN:  DOMINIC MORGAN      FINDINGS:  The cardiac silhouette is normal in size. There is prominence of  bibasilar opacities which is asymmetric in the left lung and  concerning for infectious process. No significant pleural effusion.  No pneumothorax.      Impression: Prominence of basilar opacities which is asymmetric in the left lung  and concerning for pneumonia.      MACRO:  None      Signed by: Kian Velázquez 1/2/2024 2:36 AM  Dictation workstation:   [id number]    Scheduled medications  aspirin, 81 mg, oral, Daily  atorvastatin, 40 mg, oral, Nightly  dexAMETHasone, 6 mg, oral, Daily  enoxaparin, 40 mg, subcutaneous, q24h  levothyroxine, 112 mcg, oral, Daily  losartan, 25 mg, oral, Daily  remdesivir, 100 mg, intravenous, q24h  scopolamine, 1 patch, transdermal, q72h  traZODone, 50 mg, oral, Nightly      Continuous medications     PRN medications  PRN medications: acetaminophen **OR** acetaminophen **OR** acetaminophen, benzocaine-menthol, dextromethorphan-guaifenesin, oxygen, oxygen, polyethylene glycol     Assessment/Plan   This patient currently has cardiac telemetry ordered; if you would like to modify or discontinue the telemetry order, click here to go to the orders activity to modify/discontinue the order.      Principal Problem:    Acute hypoxemic respiratory failure due to COVID-19 (CMS/[facility])  Active Problems:    COVID-19 viremia    83-year-old female significant medical history of history of CVA, atrial arrhythmia, mitral regurgitation, hypothyroidism, hypertension, dyslipidemia, left bundle branch block presenting with nausea, vomiting, diarrhea, cough, headache due to COVID-19 infection.  CTA not suggest PE.  In the setting of  developing consolidative opacities in the left lower lobe of lungs seen on CT scan, initially started antibiotics 1/2/24 however Pro-Hudson is 0.11, DC'd antibiotics 1/3/2024.  Patient being managed for acute hypoxic respiratory failure secondary to COVID infection and pneumonia.     # Acute hypoxemic respiratory failure 2/2 infection and pneumonia  -Continue remdesivir and Decadron as per COVID-19 protocol  -Will treat patient's symptoms supportively, scopolamine patch, benzocaine lozenge, Robitussin DM, , Tylenol every 4 hours as needed  - Will monitor on telemetry given QT prolongation (510 ms) on EKG  - SpO2 goal more than 92%, currently on 3 L oxygen via nasal cannula (1/2/2024 care was transitioned to intermediate level care for the high flow nasal cannula requirement, subsequently was weaned off to nasal cannula in the evening)  -Pro-Hudson 0.11, antibiotics DC'd (Rocephin and doxycycline, start: 1/2/2024, dced 1/3/24)     # History of CVA  # Atrial arrhythmia  # Mitral regurgitation  # Hypothyroidism  # Hypertension  # Dyslipidemia  -Continue home meds         DIET: Cardiac  DVT PPX: lovenox  ABX: none  CODE STATUS: full code   Disposition:  anticipate 1-2 midnight stay, patient feels safe to go home- likely will be discharged to home.

## 2024-01-03 NOTE — PROGRESS NOTES
Occupational Therapy    Evaluation    Patient Name: Mallory Meyer  MRN: [id number]  Today's Date: 1/3/2024  Time Calculation  Start Time: 1501  Stop Time: 1515  Time Calculation (min): 14 min  Eval only     Assessment  IP OT Assessment  Prognosis: Good  Evaluation/Treatment Tolerance: Patient tolerated treatment well  Medical Staff Made Aware: Yes  End of Session Communication: Bedside nurse  End of Session Patient Position: Bed, 3 rail up, Alarm on  Patient presents with decline in ADLs, functional transfers, functional mobility and would benefit from OT during acute stay to improve functional independence and safety. Recommend low intensity OT vs home with no OT pending progress to maximize functional independence and safety.     Plan:  Treatment Interventions: ADL retraining, Functional transfer training, Patient/family training, Equipment evaluation/education, Compensatory technique education  OT Frequency: 3 times per week  OT Discharge Recommendations: Low intensity level of continued care (vs home with no OT pending progress)  OT - OK to Discharge: Yes from acute care OT services to the next level of care when cleared by medical team      Subjective   Current Problem:  1. COVID-19 viremia        2. Nausea vomiting and diarrhea            General:  General  Reason for Referral: ADLs  Referred By: Danielle Gonzalez MD  Past Medical History Relevant to Rehab: 83 y.o. female with significant medical history of history of CVA, atrial arrhythmia, mitral regurgitation, hypothyroidism, hypertension, dyslipidemia, left bundle branch block presenting with nausea, vomiting, diarrhea, cough, headache.  Symptoms began 1 day ago with nausea, 6 episodes of NBNB vomit, diarrhea, body aches, subjective fever, headache, generalized weakness, nonproductive cough.  She denies chest pain, shortness of breath, recent travel, sick contacts, abdominal pain, melena, hematochezia, hematuria, dysuria, polyuria, lower leg swelling.  She  is coming from home, is able to participate in all of her ADLs independently.  (+)COVID  Co-Treatment: PT  Co-Treatment Reason: for safety  Prior to Session Communication: Bedside nurse  Patient Position Received: Bed, 3 rail up, Alarm on  Preferred Learning Style: verbal  General Comment: Patient in supine in bed and agreeable to participate in OT evaluation.    Precautions:  Medical Precautions: Fall precautions  Precautions Comment: contact, droplet (+COVID)    Pain:  Pain Assessment  Pain Assessment: 0-10  Pain Score: 0 - No pain    Objective   Cognition:  Overall Cognitive Status: Within Functional Limits    Home Living:  Home Living Comments: Lives in 1 story home alone with 0 BAY. Has WIS     Prior Function:  Prior Function Comments: Was independent with all ADLs and IADLs. No assistive device used for functional mobility tasks    ADL:  Toileting Assistance with Device: Stand by    Activity Tolerance:  Endurance: Endurance does not limit participation in activity    Bed Mobility/Transfers:   Bed Mobility  Bed Mobility:  (independent supine <>sit)  Transfers  Transfer:  (SBA sit <> stand from various surfaces. SBA without AD functional mobilty tasks)    Extremities: RUE   RUE : Within Functional Limits and LUE   LUE: Within Functional Limits    Outcome Measures: [facility] Daily Activity  Putting on and taking off regular lower body clothing: A little  Bathing (including washing, rinsing, drying): A little  Putting on and taking off regular upper body clothing: A little  Toileting, which includes using toilet, bedpan or urinal: A little  Taking care of personal grooming such as brushing teeth: A little  Eating Meals: None  Daily Activity - Total Score: 19    EDUCATION:  Education  Individual(s) Educated: Patient  Education Provided: Fall precautons  Patient Response to Education: Patient/Caregiver Verbalized Understanding of Information      Goals:   Encounter Problems       Encounter Problems (Active)        Balance       STG-Patient will be independent with dynamic stand task >5 minutes for ADL completion   (Progressing)       Start:  01/03/24    Expected End:  01/17/24               Dressings Lower Extremities       STG - Patient to complete lower body dressing independently with AE and comp strategies  (Progressing)       Start:  01/03/24    Expected End:  01/17/24               Mobility       STG-Patient will be independent with assistive device functional mobility tasks   (Progressing)       Start:  01/03/24    Expected End:  01/17/24               Transfers       STG-Patient will be independent with functional transfers demonstrating good safety   (Progressing)       Start:  01/03/24    Expected End:  01/17/24

## 2024-01-03 NOTE — NURSING NOTE
1824 pt arrived to room, pt w/o complaint. Denies SOB.  VSS. Bed alarm active, call light w/I reach. Contact plus precautions in place.

## 2024-01-04 PROBLEM — U07.1 ACUTE HYPOXEMIC RESPIRATORY FAILURE DUE TO COVID-19 (MULTI): Status: RESOLVED | Noted: 2024-01-02 | Resolved: 2024-01-04

## 2024-01-04 PROBLEM — J96.01 ACUTE HYPOXEMIC RESPIRATORY FAILURE DUE TO COVID-19 (MULTI): Status: RESOLVED | Noted: 2024-01-02 | Resolved: 2024-01-04

## 2024-01-04 LAB
ALBUMIN SERPL BCP-MCNC: 3.3 G/DL (ref 3.4–5)
ANION GAP SERPL CALC-SCNC: 10 MMOL/L (ref 10–20)
BASOPHILS # BLD AUTO: 0 X10*3/UL (ref 0–0.1)
BASOPHILS NFR BLD AUTO: 0 %
BUN SERPL-MCNC: 20 MG/DL (ref 6–23)
CALCIUM SERPL-MCNC: 8.2 MG/DL (ref 8.6–10.3)
CHLORIDE SERPL-SCNC: 103 MMOL/L (ref 98–107)
CO2 SERPL-SCNC: 31 MMOL/L (ref 21–32)
CREAT SERPL-MCNC: 0.52 MG/DL (ref 0.5–1.05)
EOSINOPHIL # BLD AUTO: 0 X10*3/UL (ref 0–0.4)
EOSINOPHIL NFR BLD AUTO: 0 %
ERYTHROCYTE [DISTWIDTH] IN BLOOD BY AUTOMATED COUNT: 13 % (ref 11.5–14.5)
GFR SERPL CREATININE-BSD FRML MDRD: >90 ML/MIN/1.73M*2
GLUCOSE SERPL-MCNC: 105 MG/DL (ref 74–99)
HCT VFR BLD AUTO: 31.2 % (ref 36–46)
HGB BLD-MCNC: 10.2 G/DL (ref 12–16)
IMM GRANULOCYTES # BLD AUTO: 0.03 X10*3/UL (ref 0–0.5)
IMM GRANULOCYTES NFR BLD AUTO: 0.5 % (ref 0–0.9)
LYMPHOCYTES # BLD AUTO: 1.69 X10*3/UL (ref 0.8–3)
LYMPHOCYTES NFR BLD AUTO: 26 %
MCH RBC QN AUTO: 29.5 PG (ref 26–34)
MCHC RBC AUTO-ENTMCNC: 32.7 G/DL (ref 32–36)
MCV RBC AUTO: 90 FL (ref 80–100)
MONOCYTES # BLD AUTO: 0.66 X10*3/UL (ref 0.05–0.8)
MONOCYTES NFR BLD AUTO: 10.2 %
NEUTROPHILS # BLD AUTO: 4.12 X10*3/UL (ref 1.6–5.5)
NEUTROPHILS NFR BLD AUTO: 63.3 %
NRBC BLD-RTO: 0 /100 WBCS (ref 0–0)
PHOSPHATE SERPL-MCNC: 3.5 MG/DL (ref 2.5–4.9)
PLATELET # BLD AUTO: 189 X10*3/UL (ref 150–450)
POTASSIUM SERPL-SCNC: 4.1 MMOL/L (ref 3.5–5.3)
RBC # BLD AUTO: 3.46 X10*6/UL (ref 4–5.2)
SODIUM SERPL-SCNC: 140 MMOL/L (ref 136–145)
WBC # BLD AUTO: 6.5 X10*3/UL (ref 4.4–11.3)

## 2024-01-04 PROCEDURE — 36415 COLL VENOUS BLD VENIPUNCTURE: CPT

## 2024-01-04 PROCEDURE — 1200000002 HC GENERAL ROOM WITH TELEMETRY DAILY

## 2024-01-04 PROCEDURE — 2500000004 HC RX 250 GENERAL PHARMACY W/ HCPCS (ALT 636 FOR OP/ED)

## 2024-01-04 PROCEDURE — 85025 COMPLETE CBC W/AUTO DIFF WBC: CPT

## 2024-01-04 PROCEDURE — 99232 SBSQ HOSP IP/OBS MODERATE 35: CPT

## 2024-01-04 PROCEDURE — 96372 THER/PROPH/DIAG INJ SC/IM: CPT

## 2024-01-04 PROCEDURE — 2500000001 HC RX 250 WO HCPCS SELF ADMINISTERED DRUGS (ALT 637 FOR MEDICARE OP)

## 2024-01-04 PROCEDURE — 80069 RENAL FUNCTION PANEL: CPT

## 2024-01-04 RX ORDER — GUAIFENESIN/DEXTROMETHORPHAN 100-10MG/5
5 SYRUP ORAL EVERY 4 HOURS PRN
Qty: 118 ML | Refills: 0 | Status: SHIPPED | OUTPATIENT
Start: 2024-01-04 | End: 2024-01-25 | Stop reason: ALTCHOICE

## 2024-01-04 RX ADMIN — DEXAMETHASONE 6 MG: 6 TABLET ORAL at 09:14

## 2024-01-04 RX ADMIN — REMDESIVIR 100 MG: 100 INJECTION, POWDER, LYOPHILIZED, FOR SOLUTION INTRAVENOUS at 15:32

## 2024-01-04 RX ADMIN — TRAZODONE HYDROCHLORIDE 50 MG: 50 TABLET ORAL at 20:51

## 2024-01-04 RX ADMIN — ASPIRIN 81 MG: 81 TABLET, COATED ORAL at 09:14

## 2024-01-04 RX ADMIN — LEVOTHYROXINE SODIUM 112 MCG: 0.11 TABLET ORAL at 09:14

## 2024-01-04 RX ADMIN — ATORVASTATIN CALCIUM 40 MG: 40 TABLET, FILM COATED ORAL at 20:51

## 2024-01-04 RX ADMIN — ENOXAPARIN SODIUM 40 MG: 40 INJECTION SUBCUTANEOUS at 09:15

## 2024-01-04 RX ADMIN — LOSARTAN POTASSIUM 25 MG: 25 TABLET, FILM COATED ORAL at 09:15

## 2024-01-04 ASSESSMENT — COGNITIVE AND FUNCTIONAL STATUS - GENERAL
DAILY ACTIVITIY SCORE: 24
MOBILITY SCORE: 24

## 2024-01-04 ASSESSMENT — PAIN - FUNCTIONAL ASSESSMENT: PAIN_FUNCTIONAL_ASSESSMENT: 0-10

## 2024-01-04 ASSESSMENT — PAIN SCALES - GENERAL
PAINLEVEL_OUTOF10: 0 - NO PAIN
PAINLEVEL_OUTOF10: 0 - NO PAIN

## 2024-01-04 NOTE — PROGRESS NOTES
Mallory Meyer is a 83 y.o. female on day 3 of admission presenting with Acute hypoxemic respiratory failure due to COVID-19 (CMS/Formerly [facility]).      Subjective   Patient was seen and examined this morning.  She reported that she is feeling well overall.    Overnight events:  No acute events overnight    Interval events: Patient was satting 100% on 3 L nasal cannula.  Be weaned down the oxygen to room air.  Patient is satting at 96% on room air and 93% while ambulating on room air.  She does not require any further oxygen supplementation.       Objective     Last Recorded Vitals  /55 (BP Location: Left arm, Patient Position: Sitting)   Pulse 73   Temp 36 °C (96.8 °F) (Temporal)   Resp 15   Wt 63.8 kg (140 lb 10.5 oz)   SpO2 99%   Intake/Output last 3 Shifts:    Intake/Output Summary (Last 24 hours) at 1/4/2024 1130  Last data filed at 1/4/2024 0542  Gross per 24 hour   Intake 480 ml   Output --   Net 480 ml       Admission Weight  Weight: 61.2 kg (135 lb) (01/02/24 0129)    Daily Weight  01/03/24 : 63.8 kg (140 lb 10.5 oz)      Constitutional: Well developed,  well appearing adult in no acute distress.   NEURO: Alert and oriented. Moves all extremities. Face is symmetric and expressive. Sensation is intact over all dermatomal distributions of the right upper extremity. Strength is intact  EYES: PERRL. No scleral icterus or conjunctival injection. No discharge.   HENT: Normocephalic, atraumatic. Hearing is grossly intact. Nares grossly patent and without discharge. Mucous membranes moist.   NECK: No JVD. Patient moves neck without restriction.   CARDIO: Rhythm regular. Normal rate. No murmur, rub, or gallop. Pulses equal bilaterally in the upper and lower extremity.   PULM: Rhonchi bilaterally, improved from admission  GI/: Abdomen is soft and non-tender. Normoactive bowel sounds.   EXTREMITIES: No clubbing, cyanosis, or deformity. No lower extremity edema. No tenderness along the deep venous distribution of  the bilateral lower extremities  SKIN: Warm and dry. Normal turgor. No rash noted over the area of pain  PSYCH: Mood, affect, and interaction is appropriate to the setting.      Results for orders placed or performed during the hospital encounter of 01/02/24 (from the past 24 hour(s))   CBC and Auto Differential   Result Value Ref Range    WBC 6.5 4.4 - 11.3 x10*3/uL    nRBC 0.0 0.0 - 0.0 /100 WBCs    RBC 3.46 (L) 4.00 - 5.20 x10*6/uL    Hemoglobin 10.2 (L) 12.0 - 16.0 g/dL    Hematocrit 31.2 (L) 36.0 - 46.0 %    MCV 90 80 - 100 fL    MCH 29.5 26.0 - 34.0 pg    MCHC 32.7 32.0 - 36.0 g/dL    RDW 13.0 11.5 - 14.5 %    Platelets 189 150 - 450 x10*3/uL    Neutrophils % 63.3 40.0 - 80.0 %    Immature Granulocytes %, Automated 0.5 0.0 - 0.9 %    Lymphocytes % 26.0 13.0 - 44.0 %    Monocytes % 10.2 2.0 - 10.0 %    Eosinophils % 0.0 0.0 - 6.0 %    Basophils % 0.0 0.0 - 2.0 %    Neutrophils Absolute 4.12 1.60 - 5.50 x10*3/uL    Immature Granulocytes Absolute, Automated 0.03 0.00 - 0.50 x10*3/uL    Lymphocytes Absolute 1.69 0.80 - 3.00 x10*3/uL    Monocytes Absolute 0.66 0.05 - 0.80 x10*3/uL    Eosinophils Absolute 0.00 0.00 - 0.40 x10*3/uL    Basophils Absolute 0.00 0.00 - 0.10 x10*3/uL   Renal Function Panel   Result Value Ref Range    Glucose 105 (H) 74 - 99 mg/dL    Sodium 140 136 - 145 mmol/L    Potassium 4.1 3.5 - 5.3 mmol/L    Chloride 103 98 - 107 mmol/L    Bicarbonate 31 21 - 32 mmol/L    Anion Gap 10 10 - 20 mmol/L    Urea Nitrogen 20 6 - 23 mg/dL    Creatinine 0.52 0.50 - 1.05 mg/dL    eGFR >90 >60 mL/min/1.73m*2    Calcium 8.2 (L) 8.6 - 10.3 mg/dL    Phosphorus 3.5 2.5 - 4.9 mg/dL    Albumin 3.3 (L) 3.4 - 5.0 g/dL        ECG 12 lead    Result Date: 1/2/2024  Normal sinus rhythm Left bundle branch block Abnormal ECG When compared with ECG of 02-JAN-2024 01:53, (unconfirmed) Fusion complexes are no longer Present Premature ventricular complexes are no longer Present    ECG 12 lead    Result Date: 1/2/2024  Sinus  rhythm and Premature ventricular complexes or Fusion complexes Left bundle branch block Abnormal ECG When compared with ECG of 21-NOV-2022 15:25, Fusion complexes are now Present    CT angio chest for pulmonary embolism    Result Date: 1/2/2024  STUDY: CT Angiogram of the Chest; 1/2/2024, 9:32AM. INDICATION: Elevated d-dimer, hypoxic.  Tachycardic. COMPARISON: CXR same day.  CTA chest 11/21/2022. ACCESSION NUMBER(S): [id number] TECHNIQUE:  CTA of the chest was performed with intravenous contrast. Images are reviewed and processed at a workstation according to the CT angiogram protocol with 3-D and/or MIP post processing imaging generated.  Omnipaque-350, 50 cm3, administered intravenously. Automated mA/kV exposure control was utilized and patient examination was performed in strict accordance with principles of ALARA. FINDINGS: Chest: Topogram grossly negative.  Lungs show bilateral basilar confluent lung markings, left side more than right, likely bilateral basilar subsegmental atelectasis, segmental infectious consolidation less likely.  No lung mass.  There is evidence however in the lungs with a number of small calcified granulomas. Mediastinum: Shows no significant lymphadenopathy, although there are a number of small lymph nodes, likely reactive in nature.  Trachea/esophagus grossly negative. Great Vessels/Heart: Thoracic aorta is normal, without dissection or aneurysm. Pulmonary arteries show no filling defects to suggest pulmonary embolism.  Heart is not enlarged, coronary artery minimal  atherosclerotic disease; no pericardial effusion.   Thoracic aorta without dissection or aneurysm.  Pulmonary arteries show no filling defects.  Heart not enlarged, coronary arteries with only minimal atherosclerotic changes. Chest wall: No mass. Upper Abdomen: Unremarkable. Skeleton: No vertebral body spondylolisthesis; no vertebral body or rib cage fracture. No osseous lytic/blastic lesions.    1.  Chest shows no  evidence of pulmonary artery embolism; thoracic aorta without dissection. 2.  Lungs demonstrate bilateral basilar subsegmental atelectasis, left side more than right side, potentially developing early left lower lobe infectious consolidation? 3.  No overt pulmonary venous congestion, no significant cardiomegaly.  Signed by Issac Weber MD    XR chest 1 view    Result Date: 1/2/2024  Interpreted By:  Kian Velázquez, STUDY: XR CHEST 1 VIEW;  1/2/2024 2:31 am   INDICATION: Signs/Symptoms:cough,body aches, fever, chills, N/V/D.   COMPARISON: 11/21/2022   ACCESSION NUMBER(S): [id number]   ORDERING CLINICIAN: DOMINIC MORGAN   FINDINGS: The cardiac silhouette is normal in size. There is prominence of bibasilar opacities which is asymmetric in the left lung and concerning for infectious process. No significant pleural effusion. No pneumothorax.       Prominence of basilar opacities which is asymmetric in the left lung and concerning for pneumonia.   MACRO: None   Signed by: Kian Velázquez 1/2/2024 2:36 AM Dictation workstation:   [id number]    Image Results  ECG 12 lead  Sinus rhythm and Premature ventricular complexes or Fusion complexes  Left bundle branch block  Abnormal ECG  When compared with ECG of 21-NOV-2022 15:25,  Fusion complexes are now Present  ECG 12 lead  Normal sinus rhythm  Left bundle branch block  Abnormal ECG  When compared with ECG of 02-JAN-2024 01:53, (unconfirmed)  Fusion complexes are no longer Present  Premature ventricular complexes are no longer Present  CT angio chest for pulmonary embolism  Narrative: STUDY:  CT Angiogram of the Chest; 1/2/2024, 9:32AM.  INDICATION:  Elevated d-dimer, hypoxic.  Tachycardic.  COMPARISON:  CXR same day.  CTA chest 11/21/2022.  ACCESSION NUMBER(S):  [id number]  TECHNIQUE:  CTA of the chest was performed with intravenous contrast.   Images are reviewed and processed at a workstation according to the CT  angiogram protocol with 3-D and/or MIP post processing  imaging  generated.  Omnipaque-350, 50 cm3, administered intravenously.  Automated mA/kV exposure control was utilized and patient examination  was performed in strict accordance with principles of ALARA.  FINDINGS:   Chest:   Topogram grossly negative.  Lungs show bilateral basilar confluent  lung markings, left side more than right, likely bilateral basilar  subsegmental atelectasis, segmental infectious consolidation less  likely.  No lung mass.  There is evidence however in the lungs with a  number of small calcified granulomas.  Mediastinum:  Shows no significant lymphadenopathy, although there are a number of  small lymph nodes, likely reactive in nature.  Trachea/esophagus  grossly negative.  Great Vessels/Heart:   Thoracic aorta is normal, without dissection or aneurysm. Pulmonary  arteries show no filling defects to suggest pulmonary embolism.  Heart  is not enlarged, coronary artery minimal  atherosclerotic disease; no  pericardial effusion.   Thoracic aorta without dissection or aneurysm.   Pulmonary arteries show no filling defects.  Heart not enlarged,  coronary arteries with only minimal atherosclerotic changes.  Chest wall:  No mass.  Upper Abdomen:  Unremarkable.  Skeleton:   No vertebral body spondylolisthesis; no vertebral body or rib cage  fracture. No osseous lytic/blastic lesions.  Impression: 1.  Chest shows no evidence of pulmonary artery embolism; thoracic  aorta without dissection.  2.  Lungs demonstrate bilateral basilar subsegmental atelectasis, left  side more than right side, potentially developing early left lower  lobe infectious consolidation?  3.  No overt pulmonary venous congestion, no significant cardiomegaly.     Signed by Issac Wbeer MD  XR chest 1 view  Narrative: Interpreted By:  Kian Velázquez,   STUDY:  XR CHEST 1 VIEW;  1/2/2024 2:31 am      INDICATION:  Signs/Symptoms:cough,body aches, fever, chills, N/V/D.      COMPARISON:  11/21/2022      ACCESSION  NUMBER(S):  [id number]      ORDERING CLINICIAN:  DOMINIC MORGAN      FINDINGS:  The cardiac silhouette is normal in size. There is prominence of  bibasilar opacities which is asymmetric in the left lung and  concerning for infectious process. No significant pleural effusion.  No pneumothorax.      Impression: Prominence of basilar opacities which is asymmetric in the left lung  and concerning for pneumonia.      MACRO:  None      Signed by: Kian Velázquez 1/2/2024 2:36 AM  Dictation workstation:   [id number]      Scheduled medications  aspirin, 81 mg, oral, Daily  atorvastatin, 40 mg, oral, Nightly  dexAMETHasone, 6 mg, oral, Daily  enoxaparin, 40 mg, subcutaneous, q24h  levothyroxine, 112 mcg, oral, Daily  losartan, 25 mg, oral, Daily  remdesivir, 100 mg, intravenous, q24h  scopolamine, 1 patch, transdermal, q72h  traZODone, 50 mg, oral, Nightly      Continuous medications     PRN medications  PRN medications: acetaminophen **OR** acetaminophen **OR** acetaminophen, benzocaine-menthol, dextromethorphan-guaifenesin, oxygen, oxygen, polyethylene glycol        Assessment/Plan        83-year-old female significant medical history of history of CVA, atrial arrhythmia, mitral regurgitation, hypothyroidism, hypertension, dyslipidemia, left bundle branch block presenting with nausea, vomiting, diarrhea, cough, headache due to COVID-19 infection.  CTA not suggest PE.  In the setting of developing consolidative opacities in the left lower lobe of lungs seen on CT scan, initially started antibiotics 1/2/24 however Pro-Hudson is 0.11, DC'd antibiotics 1/3/2024.  Patient being managed for acute hypoxic respiratory failure secondary to COVID infection and pneumonia.  Patient has progressively improved, not requiring any supplemental oxygen at rest or upon ambulation now.     # Acute hypoxemic respiratory failure 2/2 infection and pneumonia  -Continue remdesivir and Decadron as per COVID-19 protocol  -Will treat patient's symptoms  supportively, scopolamine patch, benzocaine lozenge, Robitussin DM, , Tylenol every 4 hours as needed  - Will monitor on telemetry given QT prolongation (510 ms) on EKG  - SpO2 goal more than 92%, currently on 3 L oxygen via nasal cannula (1/2/2024 care was transitioned to intermediate level care for the high flow nasal cannula requirement, subsequently was weaned off to nasal cannula 1/2/2024 evening.  Today patient is satting at 96% at rest and 93% while ambulating both on room air.  -Pro-Hudson 0.11, antibiotics DC'd (Rocephin and doxycycline, start: 1/2/2024, dced 1/3/24)     # History of CVA  # Atrial arrhythmia  # Mitral regurgitation  # Hypothyroidism  # Hypertension  # Dyslipidemia  -Continue home meds         DIET: Cardiac  DVT PPX: lovenox  ABX: none  CODE STATUS: full code   Disposition: She will be discharged to home with home health care tomorrow.          Active Problems:    COVID-19 viremia      Assessment and plan discussed with attending physician,    -Elaine Trinidad MD PGY-3

## 2024-01-04 NOTE — DISCHARGE INSTRUCTIONS
Please follow up with your primary care provider within 14 days for hospital follow up. Please call to make this appointment.     Please take your medications as prescribed.  We will arrange home health care services for you to provide you physical therapy at home.  If you have any new or worsening symptoms seek medical attention.  Thank you for allowing us to participate in your care!

## 2024-01-04 NOTE — NURSING NOTE
Shift summary:  Uneventful night. Pt is able to ambulate to bathroom safely but bed alarm placed at night since room is far from nurses' station and pt is on O2. Spo2 stable on 3L NC.  Tele Dc'd. No pain. Mild cough. No complaints. Vitals stable. Education provided about medications and questions on treatment. One IV came out when ambulating. 20 gauge is still in place. Safety maintained.

## 2024-01-04 NOTE — PROGRESS NOTES
Phone call to patients room. Discussed home therapy through [facility] services. She is agreeable. Offered Choice list from [facility].  She has requested [facility] but not to start until Monday 1/8/24.  Her daughter will be here tomorrow form [place] to care for her. She is trying to contact her son to take her home today, States he may not be able to come and get here. Dr. Gonzalez notified.  Jennifer Triplett RN

## 2024-01-04 NOTE — DISCHARGE SUMMARY
Discharge Diagnosis  Acute hypoxemic respiratory failure due to COVID-19 (CMS/Formerly [facility])    Issues Requiring Follow-Up  With PCP within 2 weeks of discharge from the hospital    Discharge Meds     Your medication list        START taking these medications        Instructions Last Dose Given Next Dose Due   benzocaine-menthol 15-3.6 mg lozenge  Commonly known as: Cepastat Sore Throat      Dissolve 1 lozenge in the mouth every 2 hours if needed for sore throat for up to 10 days.       dextromethorphan-guaifenesin  mg/5 mL oral liquid  Commonly known as: Robitussin DM      Take 5 mL by mouth every 4 hours if needed for cough.              CONTINUE taking these medications        Instructions Last Dose Given Next Dose Due   aspirin 81 mg EC tablet           atorvastatin 40 mg tablet  Commonly known as: Lipitor           levothyroxine 112 mcg tablet  Commonly known as: Synthroid, Levoxyl      Take 1 tablet (112 mcg) by mouth once daily.       losartan 25 mg tablet  Commonly known as: Cozaar      Take 1 tablet (25 mg) by mouth once daily.       traZODone 50 mg tablet  Commonly known as: Desyrel      Take 1 tablet (50 mg) by mouth once daily at bedtime.                 Where to Get Your Medications        These medications were sent to [id number] - [place], [place] - [address] [address] [facility]  [address] [id number]      Phone: [phone]   benzocaine-menthol 15-3.6 mg lozenge  dextromethorphan-guaifenesin  mg/5 mL oral liquid         Test Results Pending At Discharge  Pending Labs       No current pending labs.            Hospital Course  83-year-old female significant medical history of history of CVA, atrial arrhythmia, mitral regurgitation, hypothyroidism, hypertension, dyslipidemia, left bundle branch block presenting with nausea, vomiting, diarrhea, cough, headache due to COVID-19 infection.    Patient was managed at [facility] 1/2/2024-1/4/2024 for acute hypoxic respiratory failure secondary to  COVID-19 pneumonia.  CTA not suggest PE.  In the setting of developing consolidative opacities in the left lower lobe of lungs seen on CT scan, initially started antibiotics 1/2/24 however Pro-Hudson is 0.11, DC'd antibiotics 1/3/2024.  Patient received Decadron 6 mg and remdesivir as per COVID-19 protocol starting from 1/2/2024-1/4/2024.  Patient was initially on 3 L nasal cannula however on 1/2/2024 she transition to high flow nasal cannula for a few hours however later on was back to 3 L nasal cannula.  In the morning of 1/4/2024, oxygen was weaned off and at room air patient was satting at 96% while at rest and at 93% while ambulating.  She does not require any more supplemental oxygen.  Decadron and remdesivir discontinued.  Patient is medically stable and discharged on 1/4/2024 to home with home health care.    Pertinent Physical Exam At Time of Discharge  Physical Exam  Constitutional: Well developed,  well appearing adult in no acute distress.   NEURO: Alert and oriented. Moves all extremities. Face is symmetric and expressive. Sensation is intact over all dermatomal distributions of the right upper extremity. Strength is intact  EYES: PERRL. No scleral icterus or conjunctival injection. No discharge.   HENT: Normocephalic, atraumatic. Hearing is grossly intact. Nares grossly patent and without discharge. Mucous membranes moist.   NECK: No JVD. Patient moves neck without restriction.   CARDIO: Rhythm regular. Normal rate. No murmur, rub, or gallop. Pulses equal bilaterally in the upper and lower extremity.   PULM: Mild rhonchi bilaterally, improved from admission  GI/: Abdomen is soft and non-tender. Normoactive bowel sounds.   EXTREMITIES: No clubbing, cyanosis, or deformity. No lower extremity edema. No tenderness along the deep venous distribution of the bilateral lower extremities  SKIN: Warm and dry. Normal turgor. No rash noted over the area of pain  PSYCH: Mood, affect, and interaction is appropriate  to the setting.    Outpatient Follow-Up  Future Appointments   Date Time Provider Department [place]   1/9/2024 11:20 AM PING Turner [id number] [place]   5/7/2024 10:30 AM Wing Del Castillo MD [id number] [place]   7/9/2024 11:40 AM PING Turner [id number] [place]     Follow-up with PCP within 2 weeks of discharge from the hospital.    Elaine Trinidad MD

## 2024-01-04 NOTE — CARE PLAN
The patient's goals for the shift include      The clinical goals for the shift include Patient will remain free from SOB throughout shift.      Problem: Respiratory  Goal: Minimal/no exertional discomfort or dyspnea this shift  Outcome: Progressing  Goal: No signs of respiratory distress (eg. Use of accessory muscles. Peds grunting)  Outcome: Progressing  Goal: Wean oxygen to maintain O2 saturation per order/standard this shift  Outcome: Progressing     Problem: Infection related to problem list condition  Goal: Infection will resolve through treatment  Outcome: Progressing     Problem: Dressings Lower Extremities  Goal: STG - Patient to complete lower body dressing independently with AE and comp strategies   Outcome: Progressing

## 2024-01-04 NOTE — NURSING NOTE
Assumed care of pt. Pt resting in bed w/o complaint. Up to bathroom independently w/o sob. New IV to right wrist d/t IV site on L FA infiltrated. Call light w/I reach. Will continue to monitor.

## 2024-01-05 ENCOUNTER — DOCUMENTATION (OUTPATIENT)
Dept: HOME HEALTH SERVICES | Facility: HOME HEALTH | Age: 84
End: 2024-01-05
Payer: MEDICARE

## 2024-01-05 ENCOUNTER — HOME HEALTH ADMISSION (OUTPATIENT)
Dept: HOME HEALTH SERVICES | Facility: HOME HEALTH | Age: 84
End: 2024-01-05
Payer: MEDICARE

## 2024-01-05 VITALS
HEART RATE: 82 BPM | OXYGEN SATURATION: 95 % | WEIGHT: 140.65 LBS | SYSTOLIC BLOOD PRESSURE: 134 MMHG | DIASTOLIC BLOOD PRESSURE: 65 MMHG | RESPIRATION RATE: 14 BRPM | BODY MASS INDEX: 23.43 KG/M2 | HEIGHT: 65 IN | TEMPERATURE: 98.2 F

## 2024-01-05 PROCEDURE — 2500000004 HC RX 250 GENERAL PHARMACY W/ HCPCS (ALT 636 FOR OP/ED)

## 2024-01-05 PROCEDURE — 99239 HOSP IP/OBS DSCHRG MGMT >30: CPT | Performed by: STUDENT IN AN ORGANIZED HEALTH CARE EDUCATION/TRAINING PROGRAM

## 2024-01-05 PROCEDURE — 2500000001 HC RX 250 WO HCPCS SELF ADMINISTERED DRUGS (ALT 637 FOR MEDICARE OP)

## 2024-01-05 RX ADMIN — LOSARTAN POTASSIUM 25 MG: 25 TABLET, FILM COATED ORAL at 08:44

## 2024-01-05 RX ADMIN — LEVOTHYROXINE SODIUM 112 MCG: 0.11 TABLET ORAL at 08:44

## 2024-01-05 RX ADMIN — DEXAMETHASONE 6 MG: 6 TABLET ORAL at 08:44

## 2024-01-05 RX ADMIN — ASPIRIN 81 MG: 81 TABLET, COATED ORAL at 08:44

## 2024-01-05 ASSESSMENT — PAIN SCALES - GENERAL: PAINLEVEL_OUTOF10: 0 - NO PAIN

## 2024-01-05 ASSESSMENT — PAIN - FUNCTIONAL ASSESSMENT: PAIN_FUNCTIONAL_ASSESSMENT: 0-10

## 2024-01-05 NOTE — NURSING NOTE
Patient ringing call light, upon entering room blood noted to be all over floor and patient. Patient stated her IV got caught on her gown & came out. Pt cleaned up and new bedding and gown applied. Pt refusing another IV as she states she is discharging home today. MD notified.    Patient rested quietly for remainder of night, no c/o this shift. Up to bathroom independently, no SOB noted. Call light in reach, safety maintained.

## 2024-01-05 NOTE — DISCHARGE SUMMARY
Discharge Diagnosis  Acute hypoxemic respiratory failure due to COVID-19 (CMS/[facility])    Issues Requiring Follow-Up  Follow up with your PCP within 2 weeks of discharge from the hospital.    Test Results Pending At Discharge  Pending Labs       No current pending labs.            Hospital Course  83-year-old female with a past medical history CVA, atrial arrhythmia, MI, hypothyroidism, hypertension, hyperlipidemia, LBBB presenting with nausea, vomiting, diarrhea, cough, headache and shortness of breath to the hospital.  Patient was managed at [facility] 1/2/2024-1/5/2024 acute hypoxic respiratory failure secondary to COVID infection. CTA did not suggest any pulmonary emboli.  In the setting of developing consolidative opacities in the left lower lobe seen on the CT scan, patient was initially started on antibiotics 1/2/2024 however Pro-[place]udson was noted to be 0.11. Antibiotics were discontinued on 1/3/2024. The patient did receive Decadron 6 mg and remdesivir as per COVID-19 protocol.  Patient was initially on 3 L nasal cannula on day 1 of admission, however she was transitioned to high flow nasal cannula for few hours however later on was back to 3 L nasal cannula. In the morning of 1/4/2024, oxygen was weaned off and at room air patient was saturating at 96% while at rest and at 93% while ambulating.  Patient does not require any more supplemental oxygen and has been satting well on room air since then.  Decadron and remdesivir have been discontinued.   Patient is medically stable and discharged on 1/5/2024 to home with home health care.    Pertinent Physical Exam At Time of Discharge  Physical Exam  Constitutional: Well developed,  well appearing adult in no acute distress.   NEURO: Alert and oriented. Moves all extremities. Face is symmetric and expressive. Sensation is intact over all dermatomal distributions of the right upper extremity. Strength is intact  EYES: PERRL. No scleral icterus or conjunctival injection. No  discharge.   HENT: Normocephalic, atraumatic. Hearing is grossly intact. Nares grossly patent and without discharge. Mucous membranes moist.   NECK: No JVD. Patient moves neck without restriction.   CARDIO: Rhythm regular. Normal rate. No murmur, rub, or gallop. Pulses equal bilaterally in the upper and lower extremity.   PULM: Lungs clear to auscultation in all fields. No wheezes, rales, or rhonchi. No conversational dyspnea. No splinting, stridor, or accessory muscle use.    GI/: Abdomen is soft and non-tender. Normoactive bowel sounds.   EXTREMITIES: No clubbing, cyanosis, or deformity. No lower extremity edema. No tenderness along the deep venous distribution of the bilateral lower extremities  SKIN: Warm and dry. Normal turgor. No rash noted over the area of pain  PSYCH: Mood, affect, and interaction is appropriate to the setting.      Home Medications     Medication List      START taking these medications     benzocaine-menthol 15-3.6 mg lozenge; Commonly known as: Cepastat Sore   Throat; Dissolve 1 lozenge in the mouth every 2 hours if needed for sore   throat for up to 10 days.   dextromethorphan-guaifenesin  mg/5 mL oral liquid; Commonly known   as: Robitussin DM; Take 5 mL by mouth every 4 hours if needed for cough.     CONTINUE taking these medications     aspirin 81 mg EC tablet   atorvastatin 40 mg tablet; Commonly known as: Lipitor   levothyroxine 112 mcg tablet; Commonly known as: Synthroid, Levoxyl;   Take 1 tablet (112 mcg) by mouth once daily.   losartan 25 mg tablet; Commonly known as: Cozaar; Take 1 tablet (25 mg)   by mouth once daily.   traZODone 50 mg tablet; Commonly known as: Desyrel; Take 1 tablet (50   mg) by mouth once daily at bedtime.       Outpatient Follow-Up  Future Appointments   Date Time Provider Department [place]   1/9/2024 11:20 AM KEI Turner-CNP [id number] [place]   5/7/2024 10:30 AM Wing Del Castillo MD [id number] [place]   7/9/2024 11:40 AM Melania Raya,  APRN-CNP [id number] [place]     Follow up with PCP within 2 weeks of hospital discharge.    Elaine Trinidad MD

## 2024-01-05 NOTE — CARE PLAN
The patient's goals for the shift include      The clinical goals for the shift include se care plan      Problem: Respiratory  Goal: Minimal/no exertional discomfort or dyspnea this shift  Outcome: Progressing  Goal: No signs of respiratory distress (eg. Use of accessory muscles. Peds grunting)  Outcome: Progressing  Goal: Wean oxygen to maintain O2 saturation per order/standard this shift  Outcome: Progressing     Problem: Infection related to problem list condition  Goal: Infection will resolve through treatment  Outcome: Progressing     Problem: Dressings Lower Extremities  Goal: STG - Patient to complete lower body dressing independently with AE and comp strategies   Outcome: Progressing

## 2024-01-05 NOTE — HH CARE COORDINATION
Home Care received a Referral for Physical Therapy, Occupational Therapy, and Home Health Aide. We have processed the referral for a Start of Care on 1/6/24 1-2 days.     If you have any questions or concerns, please feel free to contact us at [phone]. Follow the prompts, enter your five digit zip code, and you will be directed to your care team on WEST 2.

## 2024-01-05 NOTE — NURSING NOTE
0900 pt resting in bed w/o complaint. Pt refused lovenox and scop patch , pt educated on use of medications. Pt reports that she wants to go home today. Will continue to monitor.   1250 discharge instructions reviewed, pt sts understanding. Pt to discharge home , daughter at bedside and will transport.

## 2024-01-08 ENCOUNTER — PATIENT OUTREACH (OUTPATIENT)
Dept: CARE COORDINATION | Facility: CLINIC | Age: 84
End: 2024-01-08
Payer: MEDICARE

## 2024-01-08 ENCOUNTER — HOME CARE VISIT (OUTPATIENT)
Dept: HOME HEALTH SERVICES | Facility: HOME HEALTH | Age: 84
End: 2024-01-08
Payer: MEDICARE

## 2024-01-08 VITALS
HEART RATE: 74 BPM | HEIGHT: 65 IN | OXYGEN SATURATION: 97 % | BODY MASS INDEX: 23.32 KG/M2 | WEIGHT: 140 LBS | SYSTOLIC BLOOD PRESSURE: 110 MMHG | DIASTOLIC BLOOD PRESSURE: 60 MMHG

## 2024-01-08 DIAGNOSIS — J96.01 ACUTE HYPOXEMIC RESPIRATORY FAILURE DUE TO COVID-19 (MULTI): ICD-10-CM

## 2024-01-08 DIAGNOSIS — U07.1 ACUTE HYPOXEMIC RESPIRATORY FAILURE DUE TO COVID-19 (MULTI): ICD-10-CM

## 2024-01-08 PROCEDURE — 1090000001 HH PPS REVENUE CREDIT

## 2024-01-08 PROCEDURE — 1090000002 HH PPS REVENUE DEBIT

## 2024-01-08 PROCEDURE — G0151 HHCP-SERV OF PT,EA 15 MIN: HCPCS | Mod: HHH

## 2024-01-08 PROCEDURE — 169592 NO-PAY CLAIM PROCEDURE

## 2024-01-08 PROCEDURE — 1090000003 HH PPS REVENUE ADJ

## 2024-01-08 PROCEDURE — 0023 HH SOC

## 2024-01-08 ASSESSMENT — ACTIVITIES OF DAILY LIVING (ADL)
ENTERING_EXITING_HOME: ONE PERSON
OASIS_M1830: 01

## 2024-01-08 ASSESSMENT — BALANCE ASSESSMENTS
ATTEMPTS TO ARISE: 2 - ABLE TO RISE, ONE ATTEMPT
NUDGED: 2 - STEADY
IMMEDIATE STANDING BALANCE FIRST 5 SECONDS: 2 - STEADY WITHOUT WALKER OR OTHER SUPPORT
ARISING SCORE: 1
ARISES: 1 - ABLE, USES ARMS TO HELP
NUDGED SCORE: 2
EYES CLOSED AT MAXIMUM POSITION NUDGED: 1 - STEADY
STANDING BALANCE: 2 - NARROW STANCE WITHOUT SUPPORT
SITTING DOWN: 1 - USES ARMS OR NOT SMOOTH MOTION
SITTING BALANCE: 1 - STEADY, SAFE
TURNING 360 DEGREES STEPS: 1 - CONTINUOUS STEPS
BALANCE SCORE: 13

## 2024-01-08 ASSESSMENT — GAIT ASSESSMENTS
STEP SYMMETRY: 1 - RIGHT AND LEFT STEP LENGTH APPEAR EQUAL
INITIATION OF GAIT IMMEDIATELY AFTER GO: 1 - NO HESITANCY
TRUNK SCORE: 2
GAIT SCORE: 12
TRUNK: 2 - NO SWAY, NO FLEXION, NO USE OF ARMS, NO WALKING AID
PATH SCORE: 2
STEP CONTINUITY: 1 - STEPS APPEAR CONTINUOUS
WALKING STANCE: 1 - HEELS ALMOST TOUCHING WHILE WALKING
BALANCE AND GAIT SCORE: 25
PATH: 2 - STRAIGHT WITHOUT WALKING AID

## 2024-01-08 ASSESSMENT — ENCOUNTER SYMPTOMS: DENIES PAIN: 1

## 2024-01-08 NOTE — PROGRESS NOTES
General Medical Management Note    83 y.o. female presents for Medical Management  HPI    Discharge Facility:[facility]  Discharge Diagnosis:U07.1/J96.01 Acute hypoxemic respiratory failure due to Covid-19  Admission Date:1/2/24  Discharge Date:1/6/24       Denies recent hospitalizations, surgeries or ER visits    Diet:   healthy    unhealthy     mix of healthy and unhealthy  Caffeine per day:   Water per day:    Exercise:  Alcohol:   Tobacco:     Mammogram:   Colonoscopy:  Date            Repeat              Provider  Cologuard:         Past Medical History:   Diagnosis Date    Acute embolism and thrombosis of unspecified deep veins of right lower extremity (CMS/[facility]) 12/28/2016    Deep vein blood clot of right lower extremity    Acute respiratory failure with hypoxia (CMS/[facility]) 02/02/2019    Acute hypoxemic respiratory failure    CVA (cerebral vascular accident) (CMS/[facility]) 08/08/2022    Diverticulosis of intestine, part unspecified, without perforation or abscess without bleeding 12/08/2013    Diverticulosis    Other conditions influencing health status     No significant past medical history    Other general symptoms and signs 02/05/2018    Flu-like symptoms    Personal history of diseases of the blood and blood-forming organs and certain disorders involving the immune mechanism 01/10/2019    History of anemia    Personal history of diseases of the blood and blood-forming organs and certain disorders involving the immune mechanism 02/02/2019    History of leukocytosis    Personal history of other drug therapy 08/23/2017    History of influenza vaccination    Personal history of other medical treatment 08/17/2020    History of screening mammography    Phlebitis and thrombophlebitis of lower extremities, unspecified 12/28/2016    Phlebitis and thrombophlebitis of lower extremities    Syncope and collapse 12/29/2017    Near syncope      Past Surgical History:   Procedure Laterality Date    OTHER SURGICAL HISTORY   06/27/2022    Colonoscopy     Family History   Problem Relation Name Age of Onset    Breast cancer Mother      Lung cancer Mother      Other (malignant neoplasm of breast) Mother      Hyperlipidemia Father      Hyperlipidemia Brother      Other (malignant neoplasm of prostate) Son        Social History     Socioeconomic History    Marital status:      Spouse name: Not on file    Number of children: Not on file    Years of education: Not on file    Highest education level: Not on file   Occupational History    Not on file   Tobacco Use    Smoking status: Never    Smokeless tobacco: Never   Substance and Sexual Activity    Alcohol use: Not on file    Drug use: Not on file    Sexual activity: Not on file   Other Topics Concern    Not on file   Social History Narrative    Not on file     Social Determinants of Health     Financial Resource Strain: Low Risk  (1/2/2024)    Overall Financial Resource Strain (CARDIA)     Difficulty of Paying Living Expenses: Not hard at all   Food Insecurity: Not on file   Transportation Needs: No Transportation Needs (1/8/2024)    OASIS : Transportation     Lack of Transportation (Medical): No     Lack of Transportation (Non-Medical): No     Patient Unable or Declines to Respond: No   Physical Activity: Not on file   Stress: Not on file   Social Connections: Feeling Socially Integrated (1/8/2024)    OASIS : Social Isolation     Frequency of experiencing loneliness or isolation: Never   Intimate Partner Violence: Not on file   Housing Stability: Low Risk  (1/2/2024)    Housing Stability Vital Sign     Unable to Pay for Housing in the Last Year: No     Number of Places Lived in the Last Year: 1     Unstable Housing in the Last Year: No       Current Outpatient Medications on File Prior to Visit   Medication Sig Dispense Refill    aspirin 81 mg EC tablet Take 1 tablet (81 mg) by mouth once daily.      atorvastatin (Lipitor) 40 mg tablet Take 1 tablet (40 mg) by mouth once  daily.      benzocaine-menthol (Cepastat Sore Throat) 15-3.6 mg lozenge Dissolve 1 lozenge in the mouth every 2 hours if needed for sore throat for up to 10 days. 30 lozenge 0    dextromethorphan-guaifenesin (Robitussin DM)  mg/5 mL oral liquid Take 5 mL by mouth every 4 hours if needed for cough. 118 mL 0    levothyroxine (Synthroid, Levoxyl) 112 mcg tablet Take 1 tablet (112 mcg) by mouth once daily. 90 tablet 2    losartan (Cozaar) 25 mg tablet Take 1 tablet (25 mg) by mouth once daily. 90 tablet 2    traZODone (Desyrel) 50 mg tablet Take 1 tablet (50 mg) by mouth once daily at bedtime. 90 tablet 2     No current facility-administered medications on file prior to visit.       Allergies   Allergen Reactions    Penicillins Hives and Swelling    Sulfamethoxazole Hives         ROS: Denies chest pain, SOB, Headache, GI problems     Visit Vitals  OB Status Postmenopausal   Smoking Status Never        PHYSICAL EXAM:  Alert and oriented x3.  Eyes: EOM grossly intact  Neck supple without lymph adenopathy or carotid bruit.  No masses or thyromegaly  Heart regular rate and rhythm without murmur.  Lungs clear to auscultation.  Legs without edema.  Gait is non-antalgic  Speech clear.  Hearing adequate.          DIAGNOSIS/PLAN:        Medications refills will be completed as discussed.     Any labs or testing that is ordered will be reviewed and the results will be in your chart .   You can review these via  Wanderfly.     Follow up six months for complete physical exam.    Prescriptions will not be filled unless you are compliant with your follow-up appointments or have a follow-up appointment scheduled as per the instruction of your provider. Refills for medications should be requested at the time of your office visit.     Please allow one week for refill requests to be completed.     Contact office with any questions or concerns.   Preferred communication is via  Wanderfly  Please contact [email or web address] if  having issues with  MyChart    Melania Raya [facility]  [address], [address]  [place] [address]  Phone: [phone]    **Charting was completed using voice recognition technology and may include unintended errors**

## 2024-01-08 NOTE — PROGRESS NOTES
Discharge Facility:[facility]  Discharge Diagnosis:U07.1/J96.01 Acute hypoxemic respiratory failure due to Covid-19  Admission Date:1/2/24  Discharge Date:1/6/24    PCP Appointment Date:1/9/24  Specialist Appointment Date:N/A  Hospital Encounter and Summary: Linked     2 day appt

## 2024-01-09 ENCOUNTER — APPOINTMENT (OUTPATIENT)
Dept: PRIMARY CARE | Facility: CLINIC | Age: 84
End: 2024-01-09
Payer: MEDICARE

## 2024-01-09 ENCOUNTER — HOME CARE VISIT (OUTPATIENT)
Dept: HOME HEALTH SERVICES | Facility: HOME HEALTH | Age: 84
End: 2024-01-09
Payer: MEDICARE

## 2024-01-09 PROCEDURE — 1090000001 HH PPS REVENUE CREDIT

## 2024-01-09 PROCEDURE — 1090000002 HH PPS REVENUE DEBIT

## 2024-01-10 PROCEDURE — 1090000001 HH PPS REVENUE CREDIT

## 2024-01-10 PROCEDURE — 1090000002 HH PPS REVENUE DEBIT

## 2024-01-11 ENCOUNTER — PATIENT OUTREACH (OUTPATIENT)
Dept: CARE COORDINATION | Facility: CLINIC | Age: 84
End: 2024-01-11
Payer: MEDICARE

## 2024-01-11 PROCEDURE — 1090000001 HH PPS REVENUE CREDIT

## 2024-01-11 PROCEDURE — 1090000002 HH PPS REVENUE DEBIT

## 2024-01-11 NOTE — PROGRESS NOTES
Unable to reach patient for call back after patient's follow up appointment with PCP.   INGRISM with call back number for patient to call if needed   If no voicemail available call attempts x 2 were made to contact the patient to assist with any questions or concerns patient may have.

## 2024-01-12 PROCEDURE — 1090000002 HH PPS REVENUE DEBIT

## 2024-01-12 PROCEDURE — 1090000001 HH PPS REVENUE CREDIT

## 2024-01-13 PROCEDURE — 1090000002 HH PPS REVENUE DEBIT

## 2024-01-13 PROCEDURE — 1090000001 HH PPS REVENUE CREDIT

## 2024-01-14 PROCEDURE — 1090000002 HH PPS REVENUE DEBIT

## 2024-01-14 PROCEDURE — 1090000001 HH PPS REVENUE CREDIT

## 2024-01-15 PROCEDURE — 1090000002 HH PPS REVENUE DEBIT

## 2024-01-15 PROCEDURE — 1090000001 HH PPS REVENUE CREDIT

## 2024-01-16 PROCEDURE — 1090000001 HH PPS REVENUE CREDIT

## 2024-01-16 PROCEDURE — 1090000002 HH PPS REVENUE DEBIT

## 2024-01-17 PROCEDURE — G0180 MD CERTIFICATION HHA PATIENT: HCPCS | Performed by: FAMILY MEDICINE

## 2024-01-17 PROCEDURE — 1090000002 HH PPS REVENUE DEBIT

## 2024-01-17 PROCEDURE — 1090000001 HH PPS REVENUE CREDIT

## 2024-01-18 ENCOUNTER — HOME CARE VISIT (OUTPATIENT)
Dept: HOME HEALTH SERVICES | Facility: HOME HEALTH | Age: 84
End: 2024-01-18
Payer: MEDICARE

## 2024-01-18 PROCEDURE — 1090000001 HH PPS REVENUE CREDIT

## 2024-01-18 PROCEDURE — 1090000002 HH PPS REVENUE DEBIT

## 2024-01-18 ASSESSMENT — ACTIVITIES OF DAILY LIVING (ADL)
OASIS_M1830: 00
HOME_HEALTH_OASIS: 00

## 2024-01-20 PROBLEM — K80.20 CALCULUS OF GALLBLADDER: Status: ACTIVE | Noted: 2024-01-20

## 2024-01-20 PROBLEM — M25.569 ARTHRALGIA OF KNEE: Status: ACTIVE | Noted: 2024-01-20

## 2024-01-20 PROBLEM — D64.9 ANEMIA: Status: ACTIVE | Noted: 2024-01-20

## 2024-01-20 PROBLEM — D22.9 MELANOCYTIC NEVUS OF SKIN: Status: ACTIVE | Noted: 2024-01-20

## 2024-01-20 PROBLEM — E87.6 HYPOKALEMIA: Status: ACTIVE | Noted: 2022-11-21

## 2024-01-20 PROBLEM — K75.9 HEPATITIS: Status: ACTIVE | Noted: 2024-01-20

## 2024-01-20 PROBLEM — R11.2 NAUSEA, VOMITING, AND DIARRHEA: Status: ACTIVE | Noted: 2024-01-20

## 2024-01-20 PROBLEM — H40.009 GLAUCOMA SUSPECT: Status: ACTIVE | Noted: 2024-01-20

## 2024-01-20 PROBLEM — R19.7 NAUSEA, VOMITING, AND DIARRHEA: Status: ACTIVE | Noted: 2024-01-20

## 2024-01-20 PROBLEM — M70.60 GREATER TROCHANTERIC BURSITIS: Status: ACTIVE | Noted: 2024-01-20

## 2024-01-20 PROBLEM — M25.559 ARTHRALGIA OF HIP: Status: ACTIVE | Noted: 2024-01-20

## 2024-01-20 PROBLEM — K42.9 UMBILICAL HERNIA: Status: ACTIVE | Noted: 2024-01-20

## 2024-01-20 PROBLEM — D72.829 LEUKOCYTOSIS: Status: ACTIVE | Noted: 2024-01-20

## 2024-01-20 PROBLEM — G44.209 TENSION TYPE HEADACHE: Status: ACTIVE | Noted: 2024-01-20

## 2024-01-20 PROBLEM — R25.2 SPASM: Status: ACTIVE | Noted: 2024-01-20

## 2024-01-20 PROBLEM — L03.119 CELLULITIS OF LOWER LEG: Status: ACTIVE | Noted: 2024-01-20

## 2024-01-24 NOTE — PROGRESS NOTES
Annual Comprehensive Medical Exam:    83 y.o. female presents for full exam,  MWV and hospital follow up.     Discharge Facility:[facility]  Discharge Diagnosis: U07.1/J96.01 Acute hypoxemic respiratory failure due to Covid-19  Admission Date:1/2/24  Discharge Date:1/6/24    Diet:  healthy    meals on wheels 3 days a week  Caffeine per day: 1  Water per day: 16 ounces  Exercise: infreq   Alcohol: 1 per day  Tobacco: denies  Dentist:  upper and lower dentures  Optometrist:   yearly   Pap/Pelvic: aged out   Mammogram: 12/1/23  Colonoscopy:   aged out    Allowed to report any questions or concerns.    CVA 8/22 [employer] [place]     Hyperlipidemia: Cardio Dr Wing Del Castillo  Yearly visits-last one 5/23  Med: Lipitor  Denies myalgias or arthralgias.    Hypertension, LBBB, Non rheumatic Mitral Valve Disorder, Cardiac Arrhythmia: Cardio Dr Wing Del Castillo  Yearly visits-last one 5/23  Med: Asa 81 mg, Cozaar  She was previously referred to EPS for a loop recorder but has not made an appt. Not sure this is the route she wants to take    Hypothyroidism:  Med: Synthroid   Agreed to decrease med if able     Insomnia:   Med: Trazodone nightly  Able to function the next day    Past Medical History:   Diagnosis Date    Acute embolism and thrombosis of unspecified deep veins of right lower extremity (CMS/[facility]) 12/28/2016    Deep vein blood clot of right lower extremity    Acute respiratory failure with hypoxia (CMS/[facility]) 02/02/2019    Acute hypoxemic respiratory failure    CVA (cerebral vascular accident) (CMS/[facility]) 08/08/2022    Diverticulosis of intestine, part unspecified, without perforation or abscess without bleeding 12/08/2013    Diverticulosis    Other conditions influencing health status     No significant past medical history    Other general symptoms and signs 02/05/2018    Flu-like symptoms    Personal history of diseases of the blood and blood-forming organs and certain disorders involving the immune mechanism 01/10/2019    History  of anemia    Personal history of diseases of the blood and blood-forming organs and certain disorders involving the immune mechanism 02/02/2019    History of leukocytosis    Personal history of other drug therapy 08/23/2017    History of influenza vaccination    Personal history of other medical treatment 08/17/2020    History of screening mammography    Phlebitis and thrombophlebitis of lower extremities, unspecified 12/28/2016    Phlebitis and thrombophlebitis of lower extremities    Syncope and collapse 12/29/2017    Near syncope      Past Surgical History:   Procedure Laterality Date    OTHER SURGICAL HISTORY  06/27/2022    Colonoscopy     Family History   Problem Relation Name Age of Onset    Breast cancer Mother      Lung cancer Mother      Other (malignant neoplasm of breast) Mother      Hyperlipidemia Father      Hyperlipidemia Brother      Other (malignant neoplasm of prostate) Son        Social History     Socioeconomic History    Marital status:      Spouse name: Not on file    Number of children: Not on file    Years of education: Not on file    Highest education level: Not on file   Occupational History    Not on file   Tobacco Use    Smoking status: Never    Smokeless tobacco: Never   Substance and Sexual Activity    Alcohol use: Not on file    Drug use: Not on file    Sexual activity: Not on file   Other Topics Concern    Not on file   Social History Narrative    Not on file     Social Determinants of Health     Financial Resource Strain: Low Risk  (1/2/2024)    Overall Financial Resource Strain (CARDIA)     Difficulty of Paying Living Expenses: Not hard at all   Food Insecurity: Not on file   Transportation Needs: No Transportation Needs (1/18/2024)    OASIS : Transportation     Lack of Transportation (Medical): No     Lack of Transportation (Non-Medical): No     Patient Unable or Declines to Respond: No   Physical Activity: Not on file   Stress: Not on file   Social Connections: Feeling  "Socially Integrated (1/18/2024)    OASIS : Social Isolation     Frequency of experiencing loneliness or isolation: Never   Intimate Partner Violence: Not on file   Housing Stability: Low Risk  (1/2/2024)    Housing Stability Vital Sign     Unable to Pay for Housing in the Last Year: No     Number of Places Lived in the Last Year: 1     Unstable Housing in the Last Year: No       Current Outpatient Medications on File Prior to Visit   Medication Sig Dispense Refill    aspirin 81 mg EC tablet Take 1 tablet (81 mg) by mouth once daily.      atorvastatin (Lipitor) 40 mg tablet Take 1 tablet (40 mg) by mouth once daily.      dextromethorphan-guaifenesin (Robitussin DM)  mg/5 mL oral liquid Take 5 mL by mouth every 4 hours if needed for cough. 118 mL 0    levothyroxine (Synthroid, Levoxyl) 112 mcg tablet Take 1 tablet (112 mcg) by mouth once daily. 90 tablet 2    losartan (Cozaar) 25 mg tablet Take 1 tablet (25 mg) by mouth once daily. 90 tablet 2    traZODone (Desyrel) 50 mg tablet Take 1 tablet (50 mg) by mouth once daily at bedtime. 90 tablet 2     No current facility-administered medications on file prior to visit.       Allergies   Allergen Reactions    Penicillins Hives and Swelling    Sulfamethoxazole Hives     Visit Vitals  /58   Pulse 67   Ht 1.651 m (5' 5\")   Wt 59.4 kg (131 lb)   SpO2 99%   BMI 21.80 kg/m²   OB Status Postmenopausal   Smoking Status Never   BSA 1.65 m²         Physical Exam  Gen: Alert and oriented x3 female in no acute distress.  HEENT: Head is normocephalic.  Pupils equal and reactive to light.  Tympanic membranes are clear.  Neck is supple without adenopathy or carotid bruits.  Heart: Regular rate and rhythm without murmurs.  Lungs: Clear to auscultation bilaterally.  Breast:      Deferred to Gyn  Pelvic:         Deferred to Gyn   Abdomen: Soft with normal bowel sounds.  No masses or pain to palpation.  No bruits auscultated.  Extremities: Good range of motion of all joints. "  No significant edema. Pedal pulses +1-2/4  Neuro: No signs of focal neurologic deficit.  No tremor.  Speech and hearing are normal.  DTRs +3/4;  Muscle Strength +5/5.  Musculoskeletal: Spine with good ROM.  Leg lengths are equal.  Skin: No significant or irregular nevi visualized.  Psych: normal affect.  No suicidal ideation.  Good judgment and insight.    Diagnosis/Plan:         Medications refills will be completed as discussed.     Any labs or testing that is ordered will be reviewed and the results will be in your chart .   You can review these via  Kromek.     Follow up six months for medication management.     Prescriptions will not be filled unless you are compliant with your follow-up appointments or have a follow-up appointment scheduled as per the instruction of your provider. Refills for medications should be requested at the time of your office visit.     Please allow one week for refill requests to be completed.     Contact office with any questions or concerns.   Preferred communication is via  Kromek  Please contact [email or web address] if having issues with  [employer]    Melania Raya [facility]  [address], [address]  [place] [address]  Phone: [phone]    **Charting was completed using voice recognition technology and may include unintended errors**

## 2024-01-25 ENCOUNTER — OFFICE VISIT (OUTPATIENT)
Dept: PRIMARY CARE | Facility: CLINIC | Age: 84
End: 2024-01-25
Payer: MEDICARE

## 2024-01-25 VITALS
SYSTOLIC BLOOD PRESSURE: 118 MMHG | DIASTOLIC BLOOD PRESSURE: 58 MMHG | WEIGHT: 131 LBS | HEIGHT: 65 IN | BODY MASS INDEX: 21.83 KG/M2 | OXYGEN SATURATION: 99 % | HEART RATE: 67 BPM

## 2024-01-25 DIAGNOSIS — I63.81: ICD-10-CM

## 2024-01-25 DIAGNOSIS — E78.2 MIXED HYPERLIPIDEMIA: ICD-10-CM

## 2024-01-25 DIAGNOSIS — I49.9 CARDIAC ARRHYTHMIA, UNSPECIFIED CARDIAC ARRHYTHMIA TYPE: ICD-10-CM

## 2024-01-25 DIAGNOSIS — E03.9 HYPOTHYROIDISM, UNSPECIFIED TYPE: ICD-10-CM

## 2024-01-25 DIAGNOSIS — Z00.00 ENCOUNTER FOR MEDICARE ANNUAL WELLNESS EXAM: Primary | ICD-10-CM

## 2024-01-25 DIAGNOSIS — Z71.89 ADVANCED CARE PLANNING/COUNSELING DISCUSSION: ICD-10-CM

## 2024-01-25 DIAGNOSIS — I44.7 LEFT BUNDLE BRANCH BLOCK (LBBB): ICD-10-CM

## 2024-01-25 DIAGNOSIS — I10 PRIMARY HYPERTENSION: ICD-10-CM

## 2024-01-25 DIAGNOSIS — Z13.31 NEGATIVE DEPRESSION SCREENING: ICD-10-CM

## 2024-01-25 DIAGNOSIS — G47.00 INSOMNIA, UNSPECIFIED TYPE: ICD-10-CM

## 2024-01-25 DIAGNOSIS — Z00.00 ENCOUNTER FOR HEALTH MAINTENANCE EXAMINATION IN ADULT: ICD-10-CM

## 2024-01-25 DIAGNOSIS — R30.0 DYSURIA: ICD-10-CM

## 2024-01-25 DIAGNOSIS — I42.8 CARDIOMYOPATHY, NONISCHEMIC (MULTI): ICD-10-CM

## 2024-01-25 LAB
POC APPEARANCE, URINE: CLEAR
POC BILIRUBIN, URINE: NEGATIVE
POC BLOOD, URINE: NEGATIVE
POC COLOR, URINE: ABNORMAL
POC GLUCOSE, URINE: NEGATIVE MG/DL
POC KETONES, URINE: NEGATIVE MG/DL
POC LEUKOCYTES, URINE: ABNORMAL
POC NITRITE,URINE: NEGATIVE
POC PH, URINE: 6 PH
POC PROTEIN, URINE: ABNORMAL MG/DL
POC SPECIFIC GRAVITY, URINE: 1.02
POC UROBILINOGEN, URINE: 0.2 EU/DL

## 2024-01-25 PROCEDURE — 1159F MED LIST DOCD IN RCRD: CPT | Performed by: NURSE PRACTITIONER

## 2024-01-25 PROCEDURE — 3078F DIAST BP <80 MM HG: CPT | Performed by: NURSE PRACTITIONER

## 2024-01-25 PROCEDURE — 1126F AMNT PAIN NOTED NONE PRSNT: CPT | Performed by: NURSE PRACTITIONER

## 2024-01-25 PROCEDURE — 99397 PER PM REEVAL EST PAT 65+ YR: CPT | Performed by: NURSE PRACTITIONER

## 2024-01-25 PROCEDURE — 1157F ADVNC CARE PLAN IN RCRD: CPT | Performed by: NURSE PRACTITIONER

## 2024-01-25 PROCEDURE — 1160F RVW MEDS BY RX/DR IN RCRD: CPT | Performed by: NURSE PRACTITIONER

## 2024-01-25 PROCEDURE — 1158F ADVNC CARE PLAN TLK DOCD: CPT | Performed by: NURSE PRACTITIONER

## 2024-01-25 PROCEDURE — 3074F SYST BP LT 130 MM HG: CPT | Performed by: NURSE PRACTITIONER

## 2024-01-25 PROCEDURE — 1036F TOBACCO NON-USER: CPT | Performed by: NURSE PRACTITIONER

## 2024-01-25 PROCEDURE — 1111F DSCHRG MED/CURRENT MED MERGE: CPT | Performed by: NURSE PRACTITIONER

## 2024-01-25 PROCEDURE — 1170F FXNL STATUS ASSESSED: CPT | Performed by: NURSE PRACTITIONER

## 2024-01-25 PROCEDURE — 99214 OFFICE O/P EST MOD 30 MIN: CPT | Performed by: NURSE PRACTITIONER

## 2024-01-25 PROCEDURE — G0439 PPPS, SUBSEQ VISIT: HCPCS | Performed by: NURSE PRACTITIONER

## 2024-01-25 PROCEDURE — 1123F ACP DISCUSS/DSCN MKR DOCD: CPT | Performed by: NURSE PRACTITIONER

## 2024-01-25 PROCEDURE — 81002 URINALYSIS NONAUTO W/O SCOPE: CPT | Performed by: NURSE PRACTITIONER

## 2024-01-25 RX ORDER — TRAZODONE HYDROCHLORIDE 50 MG/1
50 TABLET ORAL NIGHTLY
Qty: 90 TABLET | Refills: 1 | Status: SHIPPED | OUTPATIENT
Start: 2024-01-25

## 2024-01-25 RX ORDER — LEVOTHYROXINE SODIUM 112 UG/1
112 TABLET ORAL DAILY
Qty: 90 TABLET | Refills: 1 | Status: SHIPPED | OUTPATIENT
Start: 2024-01-25 | End: 2024-02-21 | Stop reason: ALTCHOICE

## 2024-01-25 ASSESSMENT — PATIENT HEALTH QUESTIONNAIRE - PHQ9
2. FEELING DOWN, DEPRESSED OR HOPELESS: NOT AT ALL
SUM OF ALL RESPONSES TO PHQ9 QUESTIONS 1 & 2: 0
1. LITTLE INTEREST OR PLEASURE IN DOING THINGS: NOT AT ALL

## 2024-01-30 PROBLEM — Z71.89 ADVANCED CARE PLANNING/COUNSELING DISCUSSION: Status: ACTIVE | Noted: 2024-01-30

## 2024-01-30 PROBLEM — Z13.31 NEGATIVE DEPRESSION SCREENING: Status: ACTIVE | Noted: 2024-01-30

## 2024-01-30 PROBLEM — L03.119 CELLULITIS OF LOWER LEG: Status: RESOLVED | Noted: 2024-01-20 | Resolved: 2024-01-30

## 2024-01-30 PROBLEM — Z00.00 ENCOUNTER FOR MEDICARE ANNUAL WELLNESS EXAM: Status: ACTIVE | Noted: 2024-01-30

## 2024-01-30 PROBLEM — Z00.00 ENCOUNTER FOR HEALTH MAINTENANCE EXAMINATION IN ADULT: Status: ACTIVE | Noted: 2024-01-30

## 2024-01-30 PROBLEM — U07.1 COVID-19 VIREMIA: Status: RESOLVED | Noted: 2024-01-03 | Resolved: 2024-01-30

## 2024-01-30 ASSESSMENT — PATIENT HEALTH QUESTIONNAIRE - PHQ9
2. FEELING DOWN, DEPRESSED OR HOPELESS: NOT AT ALL
1. LITTLE INTEREST OR PLEASURE IN DOING THINGS: NOT AT ALL
SUM OF ALL RESPONSES TO PHQ9 QUESTIONS 1 AND 2: 0

## 2024-01-30 ASSESSMENT — ACTIVITIES OF DAILY LIVING (ADL)
BATHING: INDEPENDENT
DOING_HOUSEWORK: INDEPENDENT
DRESSING: INDEPENDENT
TAKING_MEDICATION: INDEPENDENT
MANAGING_FINANCES: INDEPENDENT
GROCERY_SHOPPING: INDEPENDENT

## 2024-01-31 DIAGNOSIS — I10 HYPERTENSION, UNSPECIFIED TYPE: ICD-10-CM

## 2024-01-31 DIAGNOSIS — E78.2 MIXED HYPERLIPIDEMIA: Primary | ICD-10-CM

## 2024-01-31 RX ORDER — LOSARTAN POTASSIUM 25 MG/1
25 TABLET ORAL DAILY
Qty: 90 TABLET | Refills: 3 | Status: SHIPPED | OUTPATIENT
Start: 2024-01-31

## 2024-01-31 RX ORDER — ATORVASTATIN CALCIUM 40 MG/1
40 TABLET, FILM COATED ORAL DAILY
Qty: 90 TABLET | Refills: 3 | Status: SHIPPED | OUTPATIENT
Start: 2024-01-31 | End: 2025-01-30

## 2024-01-31 NOTE — PROGRESS NOTES
Annual Comprehensive Medical Exam:    83 y.o. female presents for full exam,  MWV and hospital follow up.     Discharge Facility:[facility]  Discharge Diagnosis: U07.1/J96.01 Acute hypoxemic respiratory failure due to Covid-19  Admission Date:1/2/24  Discharge Date:1/6/24    Diet:  healthy    meals on wheels 3 days a week  Caffeine per day: 1  Water per day: 16 ounces  Exercise: infreq   Alcohol: 1 per day  Tobacco: denies  Dentist:  upper and lower dentures  Optometrist:   yearly   Pap/Pelvic: aged out   Mammogram: 12/1/23  Dexa: 10/3/20  Colonoscopy:   aged out    Allowed to report any questions or concerns.    CVA 8/22 [employer]     Hyperlipidemia: Cardio Dr Wing Del Castillo  Yearly visits-last one 5/23  Med: Lipitor  Denies myalgias or arthralgias.    Hypertension, LBBB, Non rheumatic Mitral Valve Disorder, Cardiac Arrhythmia: Cardio Dr Wing Del Castillo  Yearly visits-last one 5/23  Med: Asa 81 mg, Cozaar  She was previously referred to [facility] for a loop recorder but has not made an appt. Not sure this is the route she wants to take    Hypothyroidism:  Med: Synthroid   Agreed to decrease med if able     Insomnia:   Med: Trazodone nightly  Able to function the next day    Past Medical History:   Diagnosis Date    Acute embolism and thrombosis of unspecified deep veins of right lower extremity (CMS/[facility]) 12/28/2016    Deep vein blood clot of right lower extremity    Acute respiratory failure with hypoxia (CMS/[facility]) 02/02/2019    Acute hypoxemic respiratory failure    COVID-19 viremia 01/03/2024    CVA (cerebral vascular accident) (CMS/[facility]) 08/08/2022    Diverticulosis of intestine, part unspecified, without perforation or abscess without bleeding 12/08/2013    Diverticulosis    Eustachian tube dysfunction 01/20/2010    Other conditions influencing health status     No significant past medical history    Other general symptoms and signs 02/05/2018    Flu-like symptoms    Personal history of diseases of the blood and  blood-forming organs and certain disorders involving the immune mechanism 01/10/2019    History of anemia    Personal history of diseases of the blood and blood-forming organs and certain disorders involving the immune mechanism 02/02/2019    History of leukocytosis    Personal history of other drug therapy 08/23/2017    History of influenza vaccination    Personal history of other medical treatment 08/17/2020    History of screening mammography    Phlebitis and thrombophlebitis of lower extremities, unspecified 12/28/2016    Phlebitis and thrombophlebitis of lower extremities    Syncope and collapse 12/29/2017    Near syncope      Past Surgical History:   Procedure Laterality Date    OTHER SURGICAL HISTORY  06/27/2022    Colonoscopy     Family History   Problem Relation Name Age of Onset    Breast cancer Mother      Lung cancer Mother      Other (malignant neoplasm of breast) Mother      Hyperlipidemia Father      Hyperlipidemia Brother      Other (malignant neoplasm of prostate) Son        Social History     Socioeconomic History    Marital status:      Spouse name: Not on file    Number of children: Not on file    Years of education: Not on file    Highest education level: Not on file   Occupational History    Not on file   Tobacco Use    Smoking status: Never    Smokeless tobacco: Never   Substance and Sexual Activity    Alcohol use: Yes     Comment: 7 per week    Drug use: Never    Sexual activity: Not on file   Other Topics Concern    Not on file   Social History Narrative    Not on file     Social Determinants of Health     Financial Resource Strain: Low Risk  (1/2/2024)    Overall Financial Resource Strain (CARDIA)     Difficulty of Paying Living Expenses: Not hard at all   Food Insecurity: Not on file   Transportation Needs: No Transportation Needs (1/18/2024)    OASIS : Transportation     Lack of Transportation (Medical): No     Lack of Transportation (Non-Medical): No     Patient Unable or  "Declines to Respond: No   Physical Activity: Not on file   Stress: Not on file   Social Connections: Feeling Socially Integrated (1/18/2024)    OASIS : Social Isolation     Frequency of experiencing loneliness or isolation: Never   Intimate Partner Violence: Not on file   Housing Stability: Low Risk  (1/2/2024)    Housing Stability Vital Sign     Unable to Pay for Housing in the Last Year: No     Number of Places Lived in the Last Year: 1     Unstable Housing in the Last Year: No       Current Outpatient Medications on File Prior to Visit   Medication Sig Dispense Refill    aspirin 81 mg EC tablet Take 1 tablet (81 mg) by mouth once daily.      atorvastatin (Lipitor) 40 mg tablet Take 1 tablet (40 mg) by mouth once daily.      losartan (Cozaar) 25 mg tablet Take 1 tablet (25 mg) by mouth once daily. 90 tablet 2    [DISCONTINUED] dextromethorphan-guaifenesin (Robitussin DM)  mg/5 mL oral liquid Take 5 mL by mouth every 4 hours if needed for cough. 118 mL 0    [DISCONTINUED] levothyroxine (Synthroid, Levoxyl) 112 mcg tablet Take 1 tablet (112 mcg) by mouth once daily. 90 tablet 2    [DISCONTINUED] traZODone (Desyrel) 50 mg tablet Take 1 tablet (50 mg) by mouth once daily at bedtime. 90 tablet 2    [DISCONTINUED] benzocaine-menthol (Cepastat Sore Throat) 15-3.6 mg lozenge Dissolve 1 lozenge in the mouth every 2 hours if needed for sore throat for up to 10 days. 30 lozenge 0     No current facility-administered medications on file prior to visit.       Allergies   Allergen Reactions    Penicillins Hives and Swelling    Sulfamethoxazole Hives     Visit Vitals  /58   Pulse 67   Ht 1.651 m (5' 5\")   Wt 59.4 kg (131 lb)   SpO2 99%   BMI 21.80 kg/m²   OB Status Postmenopausal   Smoking Status Never   BSA 1.65 m²         Physical Exam  Gen: Alert and oriented x3 female in no acute distress.  HEENT: Head is normocephalic.  Pupils equal and reactive to light.  Neck is supple without adenopathy or carotid " bruits.  Heart: Regular rate and rhythm without murmurs.  Lungs: Clear to auscultation bilaterally.  Breast:      Deferred to Gyn  Pelvic:         Deferred to Gyn   Abdomen: Soft with normal bowel sounds.  No masses or pain to palpation.  No bruits auscultated.  Extremities: Good range of motion of all joints.  No significant edema. Pedal pulses +1-2/4  Neuro: No signs of focal neurologic deficit.  No tremor.  Speech and hearing are normal.  DTRs +3/4;  Muscle Strength +5/5.  Musculoskeletal: Spine with good ROM.  Leg lengths are equal.  Skin: No significant or irregular nevi visualized.  Psych: normal affect.  No suicidal ideation.  Good judgment and insight.    Diagnosis/Plan:     1. Encounter for Medicare annual wellness exam      2. Encounter for health maintenance examination in adult    3. Cerebrovascular accident (CVA) of left basal ganglia (CMS/HCC)  Stable.   Continue current meds  Follow-up with specialist per their discretion/direction.     4. Mixed hyperlipidemia  Follow-up with specialist per their discretion/direction.     5. Cardiomyopathy, nonischemic (CMS/HCC)  Follow-up with specialist per their discretion/direction.     6. Left bundle branch block (LBBB)  Follow-up with specialist per their discretion/direction.     7. Primary hypertension  Follow-up with specialist per their discretion/direction.     8. Cardiac arrhythmia, unspecified cardiac arrhythmia type  Follow-up with specialist per their discretion/direction.     9. Insomnia, unspecified type  Stable.  Continue current medications.  - traZODone (Desyrel) 50 mg tablet; Take 1 tablet (50 mg) by mouth once daily at bedtime.  Dispense: 90 tablet; Refill: 1    10. Hypothyroidism, unspecified type    Continue current medication.   If thyroid labs ordered, adjustments will be made if appopriate.    - levothyroxine (Synthroid, Levoxyl) 112 mcg tablet; Take 1 tablet (112 mcg) by mouth once daily.  Dispense: 90 tablet; Refill: 1    11. Dysuria    -  POCT UA (nonautomated) manually resulted    12. Advanced care planning/counseling discussion  Advance directives  Advanced Care Planning (including a Living Will and Healthcare POA) was discussed with the patient and/or surrogate, voluntarily, and documented in the  medical record.       13. Negative depression screening  Depression screening completed.         Medications refills will be completed as discussed.     Any labs or testing that is ordered will be reviewed and the results will be in your chart .   You can review these via  "[employer]".     Follow up six months for medication management.     Prescriptions will not be filled unless you are compliant with your follow-up appointments or have a follow-up appointment scheduled as per the instruction of your provider. Refills for medications should be requested at the time of your office visit.     Please allow one week for refill requests to be completed.     Contact office with any questions or concerns.   Preferred communication is via  "[employer]"  Please contact [email or web address] if having issues with  "[employer]"    Melania Raya [facility]  [address], [address]  [place] [address]  Phone: [phone]    **Charting was completed using voice recognition technology and may include unintended errors**

## 2024-02-13 LAB
ATRIAL RATE: 100 BPM
ATRIAL RATE: 98 BPM
P AXIS: 82 DEGREES
P AXIS: 86 DEGREES
P OFFSET: 169 MS
P OFFSET: 170 MS
P ONSET: 120 MS
P ONSET: 123 MS
PR INTERVAL: 176 MS
PR INTERVAL: 176 MS
Q ONSET: 208 MS
Q ONSET: 211 MS
QRS COUNT: 16 BEATS
QRS COUNT: 17 BEATS
QRS DURATION: 146 MS
QRS DURATION: 150 MS
QT INTERVAL: 390 MS
QT INTERVAL: 400 MS
QTC CALCULATION(BAZETT): 503 MS
QTC CALCULATION(BAZETT): 510 MS
QTC FREDERICIA: 462 MS
QTC FREDERICIA: 471 MS
R AXIS: -17 DEGREES
R AXIS: -9 DEGREES
T AXIS: 100 DEGREES
T AXIS: 102 DEGREES
T OFFSET: 406 MS
T OFFSET: 408 MS
VENTRICULAR RATE: 100 BPM
VENTRICULAR RATE: 98 BPM

## 2024-02-20 ENCOUNTER — LAB (OUTPATIENT)
Dept: LAB | Facility: LAB | Age: 84
End: 2024-02-20
Payer: MEDICARE

## 2024-02-20 PROCEDURE — 80061 LIPID PANEL: CPT

## 2024-02-20 PROCEDURE — 84439 ASSAY OF FREE THYROXINE: CPT

## 2024-02-20 PROCEDURE — 84443 ASSAY THYROID STIM HORMONE: CPT

## 2024-02-20 PROCEDURE — 85027 COMPLETE CBC AUTOMATED: CPT

## 2024-02-20 PROCEDURE — 80053 COMPREHEN METABOLIC PANEL: CPT

## 2024-02-20 PROCEDURE — 82306 VITAMIN D 25 HYDROXY: CPT

## 2024-02-21 DIAGNOSIS — E03.9 HYPOTHYROIDISM, UNSPECIFIED TYPE: Primary | ICD-10-CM

## 2024-02-21 RX ORDER — LEVOTHYROXINE SODIUM 100 UG/1
100 TABLET ORAL DAILY
Qty: 90 TABLET | Refills: 1 | Status: SHIPPED | OUTPATIENT
Start: 2024-02-21

## 2024-02-21 RX ORDER — ACETAMINOPHEN 500 MG
5000 TABLET ORAL DAILY
COMMUNITY

## 2024-05-07 ENCOUNTER — OFFICE VISIT (OUTPATIENT)
Dept: CARDIOLOGY | Facility: CLINIC | Age: 84
End: 2024-05-07
Payer: MEDICARE

## 2024-05-07 VITALS
OXYGEN SATURATION: 97 % | DIASTOLIC BLOOD PRESSURE: 70 MMHG | BODY MASS INDEX: 21.66 KG/M2 | HEART RATE: 87 BPM | WEIGHT: 130 LBS | SYSTOLIC BLOOD PRESSURE: 126 MMHG | HEIGHT: 65 IN

## 2024-05-07 DIAGNOSIS — I44.7 LEFT BUNDLE BRANCH BLOCK (LBBB): ICD-10-CM

## 2024-05-07 DIAGNOSIS — I49.8 ATRIAL ARRHYTHMIA: ICD-10-CM

## 2024-05-07 DIAGNOSIS — I34.0 NONRHEUMATIC MITRAL VALVE REGURGITATION: ICD-10-CM

## 2024-05-07 DIAGNOSIS — I10 BENIGN ESSENTIAL HYPERTENSION: Primary | ICD-10-CM

## 2024-05-07 DIAGNOSIS — I42.8 CARDIOMYOPATHY, NONISCHEMIC (MULTI): ICD-10-CM

## 2024-05-07 DIAGNOSIS — E78.5 DYSLIPIDEMIA: ICD-10-CM

## 2024-05-07 PROCEDURE — 1126F AMNT PAIN NOTED NONE PRSNT: CPT | Performed by: INTERNAL MEDICINE

## 2024-05-07 PROCEDURE — 1160F RVW MEDS BY RX/DR IN RCRD: CPT | Performed by: INTERNAL MEDICINE

## 2024-05-07 PROCEDURE — 1036F TOBACCO NON-USER: CPT | Performed by: INTERNAL MEDICINE

## 2024-05-07 PROCEDURE — 1159F MED LIST DOCD IN RCRD: CPT | Performed by: INTERNAL MEDICINE

## 2024-05-07 PROCEDURE — 99214 OFFICE O/P EST MOD 30 MIN: CPT | Performed by: INTERNAL MEDICINE

## 2024-05-07 PROCEDURE — 3074F SYST BP LT 130 MM HG: CPT | Performed by: INTERNAL MEDICINE

## 2024-05-07 PROCEDURE — 1157F ADVNC CARE PLAN IN RCRD: CPT | Performed by: INTERNAL MEDICINE

## 2024-05-07 PROCEDURE — 3078F DIAST BP <80 MM HG: CPT | Performed by: INTERNAL MEDICINE

## 2024-05-07 ASSESSMENT — ENCOUNTER SYMPTOMS
OCCASIONAL FEELINGS OF UNSTEADINESS: 0
LOSS OF SENSATION IN FEET: 0
DEPRESSION: 1

## 2024-05-07 ASSESSMENT — PAIN SCALES - GENERAL: PAINLEVEL: 0-NO PAIN

## 2024-05-07 NOTE — PROGRESS NOTES
Chief Complaint:   No chief complaint on file.     History Of Present Illness:    Mallory Meyer is a 83 y.o. female with a history of CVA, atrial arrhythmia, mitral regurgitation, hypothyroidism, hypertension, dyslipidemia, LBBB, palpitations, and PVCs being evaluated for follow-up.     Does not have any lightheadedness when she bends down and then stands up however rarely will get lightheaded when she is seated for a while and then stands up.  Only lasts a couple seconds then goes away.    We do not have amlodipine listed on her medication list.  She does not recall stopping it but does not remember taking it either.    Denies any palpitations.     Ambulatory monitor September 2022: 1 brief episode of irregular rhythm with no discernible P waves concerning for atrial fibrillation.     Echocardiogram 8/3/2022: EF 45 to 50%. Grade 1 diastolic dysfunction. Moderate MR. Mild to moderate AR. Normal left atrial size.     Carotid duplex 8/22/2022: Mild bilateral carotid artery disease.     Echocardiogram 8/21/2022: EF 45%. Moderate to severe MR mild to moderate AR.     Echocardiogram 2/25/2022: EF 55 to 60% with grade 1 diastolic dysfunction. Mild to moderate left atrial enlargement. Moderate to severe MR and mild to moderate AR.     Lexiscan nuclear stress test 2/24/2022: No evidence of ischemia or scar. Abnormal septal motion consistent with left bundle branch block. EF 71%.      Past Medical History:  She has a past medical history of Acute embolism and thrombosis of unspecified deep veins of right lower extremity (Multi) (12/28/2016), Acute respiratory failure with hypoxia (Multi) (02/02/2019), Cellulitis of lower leg (01/20/2024), COVID-19 viremia (01/03/2024), CVA (cerebral vascular accident) (Multi) (08/08/2022), Diverticulosis of intestine, part unspecified, without perforation or abscess without bleeding (12/08/2013), Eustachian tube dysfunction (01/20/2010), Other conditions influencing health status, Other  "general symptoms and signs (02/05/2018), Personal history of diseases of the blood and blood-forming organs and certain disorders involving the immune mechanism (01/10/2019), Personal history of diseases of the blood and blood-forming organs and certain disorders involving the immune mechanism (02/02/2019), Personal history of other drug therapy (08/23/2017), Personal history of other medical treatment (08/17/2020), Phlebitis and thrombophlebitis of lower extremities, unspecified (12/28/2016), and Syncope and collapse (12/29/2017).    Past Surgical History:  She has a past surgical history that includes Other surgical history (06/27/2022).      Social History:  She reports that she has never smoked. She has never used smokeless tobacco. She reports current alcohol use. She reports that she does not use drugs.    Family History:  Family History   Problem Relation Name Age of Onset    Breast cancer Mother      Lung cancer Mother      Other (malignant neoplasm of breast) Mother      Hyperlipidemia Father      Hyperlipidemia Brother      Other (malignant neoplasm of prostate) Son          Allergies:  Penicillins and Sulfamethoxazole    Outpatient Medications:  Current Outpatient Medications   Medication Instructions    aspirin 81 mg EC tablet 1 tablet, oral, Daily    atorvastatin (LIPITOR) 40 mg, oral, Daily    cholecalciferol (VITAMIN D-3) 5,000 Units, oral, Daily    levothyroxine (SYNTHROID, LEVOXYL) 100 mcg, oral, Daily    losartan (COZAAR) 25 mg, oral, Daily    traZODone (DESYREL) 50 mg, oral, Nightly       Last Recorded Vitals:  Visit Vitals  /70 (BP Location: Right arm, Patient Position: Sitting)   Pulse 87   Ht 1.651 m (5' 5\")   Wt 59 kg (130 lb)   SpO2 97%   BMI 21.63 kg/m²   OB Status Postmenopausal   Smoking Status Never   BSA 1.64 m²      LASTWT(3):   Wt Readings from Last 3 Encounters:   05/07/24 59 kg (130 lb)   01/25/24 59.4 kg (131 lb)   01/08/24 63.5 kg (140 lb)       Physical Exam:  In general: " alert and in no acute distress.   HEENT: Carotid upstrokes normal with no bruits. JVP is normal.   Pulmonary: Clear to auscultation bilaterally.  Cardiovascular: S1,S2, regular. No appreciable murmurs, rubs or gallops.   Lower extremities: Warm. 2+ distal pulses.  Trivial edema.     Last Labs:  CBC -  Recent Labs     02/20/24  1339 01/04/24  0640 01/03/24  0610   WBC 8.2 6.5 8.2   HGB 11.1* 10.2* 10.2*   HCT 35.0* 31.2* 31.5*    189 154   MCV 90 90 90       CMP -  Recent Labs     02/20/24  1339 01/04/24  0640 01/03/24  0610 01/02/24  0445 01/02/24  0215    140 137  --  138   K 3.9 4.1 4.0  --  3.6    103 102  --  101   CO2 30 31 27  --  27   ANIONGAP 13 10 12  --  14   BUN 16 20 16  --  10   CREATININE 0.78 0.52 0.58  --  0.58   EGFR 76 >90 >90  --  >90   MG  --   --   --  2.33 1.74     Recent Labs     02/20/24  1339 01/04/24  0640 01/03/24  0610 01/02/24  0215   ALBUMIN 4.0 3.3* 3.3* 3.9   ALKPHOS 98  --  70 73   ALT 15  --  20 14   AST 20  --  29 20   BILITOT 0.7  --  0.5 0.8       LIPID PANEL -   Recent Labs     02/20/24  1339 02/16/23  1330 01/03/22  1304   CHOL 116 116 225*   LDLCALC 41  --   --    LDLF  --  32 135*   HDL 56.7 66.5 60.0   TRIG 93 87 149       Recent Labs     08/21/22  0500   HGBA1C 5.4           Assessment/Plan   1) Hypertension: Blood pressure well-controlled however I am not certain what medicine she is on.  She is on losartan 25 mg however I thought she was on amlodipine 5 mg daily as well.    Asked her to check her pill bottles at home and call to let us know whether she is truly taking the amlodipine or not.     2) CVA: No obvious evidence of atrial fibrillation on ambulatory 30-day monitor.  Have spoken to her in the past about seeing an electrophysiologist for the possibility of a loop recorder to rule out occult atrial fibrillation for her stroke.  She has not been interested in this.  For now we will continue to observe.     3) dyslipidemia: Continue statin therapy  as her LDL is at goal.     4) left bundle branch block: Chronic     5) mitral regurgitation: Moderate by recent echocardiogram.      6) atrial arrhythmia: As above, a very short episode of indeterminate rhythm which could have been atrial fibrillation. At this point I would recommend that the patient be seen by electrophysiology for consideration of an implantable loop recorder as I am not convinced that this episode was truly atrial fibrillation.     7) cardiomyopathy: Presumed nonischemic based on unremarkable nuclear stress test February 2022.  Would like to repeat an echocardiogram.  If her EF remains low then I would consider starting her on low-dose metoprolol succinate.    8) follow-up: 1 year or sooner if needed       Wing Del Castillo MD

## 2024-05-09 ENCOUNTER — TELEPHONE (OUTPATIENT)
Dept: CARDIOLOGY | Facility: CLINIC | Age: 84
End: 2024-05-09
Payer: MEDICARE

## 2024-05-09 NOTE — TELEPHONE ENCOUNTER
Mrs. Meyer reports she is not taking amlodipine. Use mail order pharmacy if putting Rx in plaease.     Dr. Del Castillo replied via secure chat:      Her blood pressure was controlled at her visit. I was unclear whether she was on amlodipine or not because it was not on her list but I could not find when someone must have discontinued it.     I do not want her to start amlodipine because her BP is well controlled. I will make sure her EMR shows that she is not on it. I am NOT going to call it in.

## 2024-06-27 ENCOUNTER — HOSPITAL ENCOUNTER (OUTPATIENT)
Dept: CARDIOLOGY | Facility: HOSPITAL | Age: 84
Discharge: HOME | End: 2024-06-27
Payer: MEDICARE

## 2024-06-27 DIAGNOSIS — I42.8 CARDIOMYOPATHY, NONISCHEMIC (MULTI): ICD-10-CM

## 2024-06-27 DIAGNOSIS — I10 BENIGN ESSENTIAL HYPERTENSION: ICD-10-CM

## 2024-06-27 PROCEDURE — 93306 TTE W/DOPPLER COMPLETE: CPT

## 2024-07-02 LAB
AORTIC VALVE PEAK VELOCITY: 1.6 M/S
AV PEAK GRADIENT: 10.2 MMHG
AVA (PEAK VEL): 1.66 CM2
EJECTION FRACTION APICAL 4 CHAMBER: 37.1
EJECTION FRACTION: 48 %
LEFT VENTRICLE INTERNAL DIMENSION DIASTOLE: 5.1 CM (ref 3.5–6)
LEFT VENTRICULAR OUTFLOW TRACT DIAMETER: 1.7 CM
LV EJECTION FRACTION BIPLANE: 49 %
MITRAL VALVE E/A RATIO: 1.47
RIGHT VENTRICLE FREE WALL PEAK S': 11 CM/S
RIGHT VENTRICLE PEAK SYSTOLIC PRESSURE: 28.6 MMHG
TRICUSPID ANNULAR PLANE SYSTOLIC EXCURSION: 1.9 CM

## 2024-07-09 ENCOUNTER — APPOINTMENT (OUTPATIENT)
Dept: PRIMARY CARE | Facility: CLINIC | Age: 84
End: 2024-07-09
Payer: MEDICARE

## 2024-07-09 VITALS
WEIGHT: 133.4 LBS | OXYGEN SATURATION: 95 % | HEART RATE: 75 BPM | BODY MASS INDEX: 22.2 KG/M2 | SYSTOLIC BLOOD PRESSURE: 130 MMHG | DIASTOLIC BLOOD PRESSURE: 54 MMHG

## 2024-07-09 DIAGNOSIS — M81.0 SENILE OSTEOPOROSIS: ICD-10-CM

## 2024-07-09 DIAGNOSIS — E55.9 VITAMIN D DEFICIENCY: ICD-10-CM

## 2024-07-09 DIAGNOSIS — I34.0 NONRHEUMATIC MITRAL VALVE REGURGITATION: ICD-10-CM

## 2024-07-09 DIAGNOSIS — G47.00 INSOMNIA, UNSPECIFIED TYPE: ICD-10-CM

## 2024-07-09 DIAGNOSIS — M85.80 OSTEOPENIA, UNSPECIFIED LOCATION: ICD-10-CM

## 2024-07-09 DIAGNOSIS — E03.9 HYPOTHYROIDISM, UNSPECIFIED TYPE: ICD-10-CM

## 2024-07-09 DIAGNOSIS — I10 BENIGN ESSENTIAL HYPERTENSION: ICD-10-CM

## 2024-07-09 DIAGNOSIS — Z12.31 SCREENING MAMMOGRAM FOR BREAST CANCER: Primary | ICD-10-CM

## 2024-07-09 PROCEDURE — 1036F TOBACCO NON-USER: CPT | Performed by: NURSE PRACTITIONER

## 2024-07-09 PROCEDURE — 1160F RVW MEDS BY RX/DR IN RCRD: CPT | Performed by: NURSE PRACTITIONER

## 2024-07-09 PROCEDURE — 1158F ADVNC CARE PLAN TLK DOCD: CPT | Performed by: NURSE PRACTITIONER

## 2024-07-09 PROCEDURE — 1157F ADVNC CARE PLAN IN RCRD: CPT | Performed by: NURSE PRACTITIONER

## 2024-07-09 PROCEDURE — 1159F MED LIST DOCD IN RCRD: CPT | Performed by: NURSE PRACTITIONER

## 2024-07-09 PROCEDURE — 1123F ACP DISCUSS/DSCN MKR DOCD: CPT | Performed by: NURSE PRACTITIONER

## 2024-07-09 PROCEDURE — 99214 OFFICE O/P EST MOD 30 MIN: CPT | Performed by: NURSE PRACTITIONER

## 2024-07-09 PROCEDURE — 3078F DIAST BP <80 MM HG: CPT | Performed by: NURSE PRACTITIONER

## 2024-07-09 PROCEDURE — 3075F SYST BP GE 130 - 139MM HG: CPT | Performed by: NURSE PRACTITIONER

## 2024-07-09 RX ORDER — TRAZODONE HYDROCHLORIDE 50 MG/1
50 TABLET ORAL NIGHTLY
Qty: 90 TABLET | Refills: 1 | Status: SHIPPED | OUTPATIENT
Start: 2024-07-09

## 2024-07-09 RX ORDER — LEVOTHYROXINE SODIUM 100 UG/1
100 TABLET ORAL DAILY
Qty: 90 TABLET | Refills: 1 | Status: SHIPPED | OUTPATIENT
Start: 2024-07-09

## 2024-07-09 ASSESSMENT — PATIENT HEALTH QUESTIONNAIRE - PHQ9
SUM OF ALL RESPONSES TO PHQ9 QUESTIONS 1 AND 2: 0
2. FEELING DOWN, DEPRESSED OR HOPELESS: NOT AT ALL
1. LITTLE INTEREST OR PLEASURE IN DOING THINGS: NOT AT ALL

## 2024-07-09 NOTE — PROGRESS NOTES
Presents for med management    83 y.o. female presents for med management       Diet:  healthy    meals on wheels 3 days a week  Caffeine per day: 1  Water per day: 16 ounces  Exercise: 3 times a week, ballet and walking   Alcohol: 1 per day  Tobacco: denies  Mammogram: 12/1/23  Colonoscopy:   aged out  Dexa:     Allowed to report any questions or concerns.    CVA 8/22 Ted Pierre     Hyperlipidemia: Cardio Dr Wing Del Castillo  Yearly visits-last one 5/23  Med: Lipitor    Hypertension, LBBB, Non rheumatic Mitral Valve Disorder, Cardiac Arrhythmia: Cardio Dr Wing Del Castillo  Yearly visits  Med: Asa 81 mg, Cozaar  Pt clarified that she has not been ordered Norvasc  She was previously referred to [facility] for a loop recorder but has not made an appt. Not sure this is the route she wants to take    Hypothyroidism:  Med: Synthroid decreased to 100 mcg last visit  Tolerating well     Insomnia:   Med: Trazodone nightly  Able to function the next day    Vitamin D:   Taking Vit d      Past Medical History:   Diagnosis Date    Acute embolism and thrombosis of unspecified deep veins of right lower extremity (Multi) 12/28/2016    Deep vein blood clot of right lower extremity    Acute respiratory failure with hypoxia (Multi) 02/02/2019    Acute hypoxemic respiratory failure    Cellulitis of lower leg 01/20/2024    COVID-19 viremia 01/03/2024    CVA (cerebral vascular accident) (Multi) 08/08/2022    Diverticulosis of intestine, part unspecified, without perforation or abscess without bleeding 12/08/2013    Diverticulosis    Eustachian tube dysfunction 01/20/2010    Other conditions influencing health status     No significant past medical history    Other general symptoms and signs 02/05/2018    Flu-like symptoms    Personal history of diseases of the blood and blood-forming organs and certain disorders involving the immune mechanism 01/10/2019    History of anemia    Personal history of diseases of the blood and blood-forming organs  and certain disorders involving the immune mechanism 02/02/2019    History of leukocytosis    Personal history of other drug therapy 08/23/2017    History of influenza vaccination    Personal history of other medical treatment 08/17/2020    History of screening mammography    Phlebitis and thrombophlebitis of lower extremities, unspecified 12/28/2016    Phlebitis and thrombophlebitis of lower extremities    Syncope and collapse 12/29/2017    Near syncope      Past Surgical History:   Procedure Laterality Date    OTHER SURGICAL HISTORY  06/27/2022    Colonoscopy     Family History   Problem Relation Name Age of Onset    Breast cancer Mother      Lung cancer Mother      Other (malignant neoplasm of breast) Mother      Hyperlipidemia Father      Hyperlipidemia Brother      Other (malignant neoplasm of prostate) Son        Social History     Socioeconomic History    Marital status:      Spouse name: Not on file    Number of children: Not on file    Years of education: Not on file    Highest education level: Not on file   Occupational History    Not on file   Tobacco Use    Smoking status: Never    Smokeless tobacco: Never   Substance and Sexual Activity    Alcohol use: Yes     Comment: 7 per week    Drug use: Never    Sexual activity: Not on file   Other Topics Concern    Not on file   Social History Narrative    Not on file     Social Determinants of Health     Financial Resource Strain: Low Risk  (1/2/2024)    Overall Financial Resource Strain (CARDIA)     Difficulty of Paying Living Expenses: Not hard at all   Food Insecurity: Not on file   Transportation Needs: No Transportation Needs (1/18/2024)    OASIS : Transportation     Lack of Transportation (Medical): No     Lack of Transportation (Non-Medical): No     Patient Unable or Declines to Respond: No   Physical Activity: Not on file   Stress: Not on file   Social Connections: Feeling Socially Integrated (1/18/2024)    OASIS : Social Isolation      Frequency of experiencing loneliness or isolation: Never   Intimate Partner Violence: Not on file   Housing Stability: Low Risk  (1/2/2024)    Housing Stability Vital Sign     Unable to Pay for Housing in the Last Year: No     Number of Places Lived in the Last Year: 1     Unstable Housing in the Last Year: No       Current Outpatient Medications on File Prior to Visit   Medication Sig Dispense Refill    aspirin 81 mg EC tablet Take 1 tablet (81 mg) by mouth once daily.      atorvastatin (Lipitor) 40 mg tablet Take 1 tablet (40 mg) by mouth once daily. 90 tablet 3    cholecalciferol (Vitamin D-3) 5,000 Units tablet Take 1 tablet (5,000 Units) by mouth once daily.      levothyroxine (Synthroid, Levoxyl) 100 mcg tablet Take 1 tablet (100 mcg) by mouth once daily. 90 tablet 1    losartan (Cozaar) 25 mg tablet Take 1 tablet (25 mg) by mouth once daily. 90 tablet 3    traZODone (Desyrel) 50 mg tablet Take 1 tablet (50 mg) by mouth once daily at bedtime. 90 tablet 1     No current facility-administered medications on file prior to visit.       Allergies   Allergen Reactions    Penicillins Hives and Swelling    Sulfamethoxazole Hives     Visit Vitals  /54   Pulse 75   Wt 60.5 kg (133 lb 6.4 oz)   SpO2 95%   BMI 22.20 kg/m²   OB Status Postmenopausal   Smoking Status Never   BSA 1.67 m²             Physical Exam  Alert and oriented x 3  Neck supple without carotid bruit   Heart regular rate and rhythm without murmur  Lungs clear to auscultation.  Legs without edema.  Gait is non-antalgic  Speech clear.  Hearing adequate.  Psych: Normal affect. Good judgment and insight.       Diagnosis/Plan:   1. Hypothyroidism, unspecified type  - Comprehensive metabolic panel; Future  - Thyroxine, Free; Future  - TSH; Future  - levothyroxine (Synthroid, Levoxyl) 100 mcg tablet; Take 1 tablet (100 mcg) by mouth once daily.  Dispense: 90 tablet; Refill: 1    2. Benign essential hypertension  Follow-up with specialist per their  discretion/direction.     3. Nonrheumatic mitral valve regurgitation  Follow-up with specialist per their discretion/direction.     4. Insomnia, unspecified type  Stable.  Continue current medications.  - traZODone (Desyrel) 50 mg tablet; Take 1 tablet (50 mg) by mouth once daily at bedtime.  Dispense: 90 tablet; Refill: 1    5. Vitamin D deficiency  Stable.  Continue current medications.    6. Senile osteoporosis  Stable.  Continue current medications.  - XR DEXA bone density; Future    7. Screening mammogram for breast cancer  Stable.  Continue current medications.  - BI mammo bilateral screening tomosynthesis; Future        Medications refills will be completed as discussed.     Any labs or testing that is ordered will be reviewed and the results will be in your chart .   You can review these via  [employer].     Follow up six months for full exam and MWV    Prescriptions will not be filled unless you are compliant with your follow-up appointments or have a follow-up appointment scheduled as per the instruction of your provider. Refills for medications should be requested at the time of your office visit.     Please allow one week for refill requests to be completed.     Contact office with any questions or concerns.   Preferred communication is via  [employer]  Please contact [email or web address] if having issues with  [employer]    Melania CHOUDHURY-[facility]  [address], [address]  [place] [address]  Phone: [phone]    **Charting was completed using voice recognition technology and may include unintended errors**                    Detail Level: Detailed Quality 110: Preventive Care And Screening: Influenza Immunization: Influenza Immunization Administered during Influenza season Quality 111:Pneumonia Vaccination Status For Older Adults: Patient received any pneumococcal conjugate or polysaccharide vaccine on or after their 60th birthday and before the end of the measurement period

## 2024-07-10 DIAGNOSIS — I34.0 NONRHEUMATIC MITRAL VALVE REGURGITATION: ICD-10-CM

## 2024-07-10 DIAGNOSIS — I42.8 CARDIOMYOPATHY, NONISCHEMIC (MULTI): Primary | ICD-10-CM

## 2024-07-10 RX ORDER — METOPROLOL SUCCINATE 25 MG/1
25 TABLET, EXTENDED RELEASE ORAL DAILY
Qty: 90 TABLET | Refills: 3 | Status: SHIPPED | OUTPATIENT
Start: 2024-07-10 | End: 2025-07-10

## 2024-07-10 NOTE — PROGRESS NOTES
Spoke to the patient about the results of her echocardiogram.  Explained that with her degree of mitral regurgitation and diminished EF I would recommend at least adjusting her heart failure regimen to start with.  I have called in a prescription for metoprolol succinate 25 mg once a day.  She is going away and will be back in a month.  She would prefer waiting until she gets back next month to start the medicine.    I also would like her to be seen by the heart failure team to see if further workup is indicated.  She is hesitant to proceed with JESSE, left and right heart catheterization at this time given the lack of symptoms.

## 2024-08-27 ENCOUNTER — APPOINTMENT (OUTPATIENT)
Dept: CARDIOLOGY | Facility: CLINIC | Age: 84
End: 2024-08-27
Payer: MEDICARE

## 2024-08-27 VITALS
DIASTOLIC BLOOD PRESSURE: 67 MMHG | BODY MASS INDEX: 21.66 KG/M2 | HEART RATE: 73 BPM | SYSTOLIC BLOOD PRESSURE: 123 MMHG | HEIGHT: 65 IN | WEIGHT: 130 LBS | OXYGEN SATURATION: 98 %

## 2024-08-27 DIAGNOSIS — I50.20 ACC/AHA STAGE B SYSTOLIC HEART FAILURE (MULTI): Primary | ICD-10-CM

## 2024-08-27 DIAGNOSIS — I34.0 NONRHEUMATIC MITRAL VALVE REGURGITATION: ICD-10-CM

## 2024-08-27 DIAGNOSIS — I42.8 CARDIOMYOPATHY, NONISCHEMIC (MULTI): ICD-10-CM

## 2024-08-27 PROCEDURE — 99205 OFFICE O/P NEW HI 60 MIN: CPT | Performed by: INTERNAL MEDICINE

## 2024-08-27 PROCEDURE — 1036F TOBACCO NON-USER: CPT | Performed by: INTERNAL MEDICINE

## 2024-08-27 PROCEDURE — 3074F SYST BP LT 130 MM HG: CPT | Performed by: INTERNAL MEDICINE

## 2024-08-27 PROCEDURE — 3078F DIAST BP <80 MM HG: CPT | Performed by: INTERNAL MEDICINE

## 2024-08-27 PROCEDURE — 1159F MED LIST DOCD IN RCRD: CPT | Performed by: INTERNAL MEDICINE

## 2024-08-27 PROCEDURE — 93000 ELECTROCARDIOGRAM COMPLETE: CPT | Performed by: INTERNAL MEDICINE

## 2024-08-27 PROCEDURE — 1157F ADVNC CARE PLAN IN RCRD: CPT | Performed by: INTERNAL MEDICINE

## 2024-08-27 NOTE — PROGRESS NOTES
[facility] Advanced Heart Failure Clinic  Primary Care Physician: Melania Raya, APRN-CNP  Referring Provider/Cardiologist: Magdalene (/[facility])    Date of Visit: 2024  2:00 PM EDT  Location of visit: [address]     HPI:   Ms. Meyer is an 84F with a PMHx sig for CVA, stage B systolic HF/NICM/HFmrEF with associated mitral valve regurgitation and LBBB, hypothyroidism, and dyslipidemia who was referred to the Advanced Heart Failure clinic for consultation.     She was first noted to have LV dysfunction in  on an echocardiogram. At that time she was found to have mild LV dysfunction with an LVEF of 45-50%. A repeat echocardiogram noted persistent mild LV dysfunction. She reports that she had been relatively well until she was diagnosed with COVID earlier this year in 2024. Since that time she is now noted exertional dyspnea and fatigue.  Despite this she continues to remain active walking 3 miles a day. She currently denies chest pain, palpitations, or LE edema.       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 Pt states she drinks 1 cocktail daily.     FamHx:  Father  at the age of 48 due to the MI      Current Outpatient Medications   Medication Sig Dispense Refill    aspirin 81 mg EC tablet Take 1 tablet (81 mg) by mouth once daily.      atorvastatin (Lipitor) 40 mg tablet Take 1 tablet (40 mg) by mouth once daily. 90 tablet 3    cholecalciferol (Vitamin D-3) 5,000 Units tablet Take 1 tablet (5,000 Units) by mouth once daily.      levothyroxine (Synthroid, Levoxyl) 100 mcg tablet Take 1 tablet (100 mcg) by mouth once daily. 90 tablet 1    losartan (Cozaar) 25 mg tablet Take 1 tablet (25 mg) by mouth once daily. 90 tablet 3    traZODone (Desyrel) 50 mg tablet Take 1  "tablet (50 mg) by mouth once daily at bedtime. 90 tablet 1    metoprolol succinate XL (Toprol-XL) 25 mg 24 hr tablet Take 1 tablet (25 mg) by mouth once daily. Do not crush or chew. (Patient not taking: Reported on 8/27/2024) 90 tablet 3     No current facility-administered medications for this visit.       Allergies   Allergen Reactions    Penicillins Hives and Swelling    Sulfamethoxazole Hives         Visit Vitals  /67 (BP Location: Left arm, Patient Position: Sitting)   Pulse 73   Ht 1.651 m (5' 5\")   Wt 59 kg (130 lb)   SpO2 98%   BMI 21.63 kg/m²   OB Status Postmenopausal   Smoking Status Never   BSA 1.64 m²        Physical Exam:  On exam Ms. Meyer appears her stated age, is alert and oriented x3, and in no acute distress. Her sclera are anicteric and her oropharynx has moist mucous membranes. Her neck is supple and without thyromegaly. The JVP is ~5 cm of water above the right atrium. Her cardiac exam has regular rhythm, normal S1, S2. No S3/4. There are no audible murmurs. Her lungs are clear to auscultation bilaterally and there is no dullness to percussion. Her abdomen is soft, nontender with normoactive bowel sounds. There is no HJR. The extremities are warm and without sig edema. The skin is dry. There is no rash present. The distal pulses are 2+ in all four extremities. Her mood and affect are appropriate for todays encounter.       Cardiac Labs/Diagnostics:    Lab Results   Component Value Date    CREATININE 0.78 02/20/2024    BUN 16 02/20/2024     02/20/2024    K 3.9 02/20/2024     02/20/2024    CO2 30 02/20/2024        Recent Labs     02/20/24  1339 02/16/23  1330 01/03/22  1304   CHOL 116 116 225*   LDLF  --  32 135*   LDLCALC 41  --   --    HDL 56.7 66.5 60.0   TRIG 93 87 149       ECG (8/27/24):  Sinus rhythm (HR 75), LBBB    Echo (6/27/24):  1. The left ventricular systolic function is mildly decreased, with a visually estimated ejection fraction of 45-50%.  2. There is global " hypokinesis of the left ventricle with minor regional variations.  3. Left ventricular diastolic filling was indeterminate.  4. There is normal right ventricular global systolic function.  5. The left atrium is mild to moderately dilated.  6. Moderate to severe mitral valve regurgitation.  7. RVSP within normal limits.  8. Aortic valve sclerosis.  9. Mild to moderate aortic valve regurgitation.  10. MV inflow velocites are difficult to interpret with the signifincant MR. They suggest high LA pressures.  11. The MR jet is wide and by LA area appears severe however pulmonary vein flow reversal was not seen.    Echo (11/1/22):  1. Left ventricular systolic function is mildly decreased with a 45-50% estimated ejection fraction.  2. Spectral Doppler shows an impaired relaxation pattern of left ventricular diastolic filling.  3. Moderate mitral valve regurgitation.  4. Mild to moderate aortic valve regurgitation.  5. There is global hypokinesis of the left ventricle with minor regional variations.    Echo (2/25/22):  1. The left ventricular systolic function is normal with a 55-60% estimated ejection fraction.  2. Spectral Doppler shows an abnormal pattern of left ventricular diastolic filling.  3. The left atrium is mild to moderately dilated.  4. Moderate to severe mitral valve regurgitation.  5. There is mild to moderate aortic valve regurgitation.    Nuclear stress (2/15/22):  1. No nuclear evidence of pharmacologic stress-induced ischemia or scar. Anterior soft tissue attenuation suggested.  2. Normal LV function with abnormal septal motion consistent with left bundle branch block. EF 71%.  3. Based on these findings low likelihood of flow-limiting coronary artery disease.      Impression/Plan:  Ms. Meyer is an 84F with a PMHx sig for CVA, stage B systolic HF/NICM/HFmrEF with associated mitral valve regurgitation and LBBB, hypothyroidism, and dyslipidemia who was referred to the Advanced Heart Failure clinic for  "consultation.     1) Stage B systolic HF/NICM/HFmrEF (LVEF 45-50%; 6/2024) with associated mitral valve regurgitation and LBBB  First noted to have LV dysfunction on an echocardiogram in 11/2022 (prior echo 2/2022 with preserved function). In addition her echo notes moderate to severe mitral regurgitation. A nuclear stress test performed showed no evidence of ischemia or infarct. When she comes in today she reports some hesitancy about starting metoprolol which had been previously prescribed by Dr. Del Castillo.  She was concerned that this medication was a \"blood pressure\" medicine. She was concerned that she was already taking a \"blood pressure\" medicine with her losartan and did not understand why she needed to take an additional medication.  We discussed the importance of optimizing GDMT given her LV dysfunction, albeit mild. In addition we talked about optimizing GDMT in an attempt to improve her mitral regurgitation. She is in agreement with optimizing GDMT and reassessment.  -start metoprolol succinate 25 mg daily  -c/w losartan 25 mg daily  -monitor vitals and call in 1-2 weeks  -labs (RFP/BNP) in 1-2 weeks  -pending labs/vitals, will consider MRA, SGLT2, and/or switching to ARNi  -once on OMT for HFmrEF will repeat an echocardiogram to reassess; pending the results, may consider referral to the Structural heart team re: JADON      F/U: 3 months at /[facility]    Wing  Thank you for referring Ms. Meyer to the  Advanced Heart Failure Clinic. Please let me know if you have any questions.       ____________________________________________________________  Cooper Ambrose, DO  Section of Advanced Heart Failure and Cardiac Transplantation  Division of Cardiovascular Medicine  [facility]  [facility]  "

## 2024-08-27 NOTE — PATIENT INSTRUCTIONS
It was a pleasure seeing you today. Please contact myself or my team with any questions.     To reach Dr. Ambrose' office please call [phone] (Deepak).   Fax: [phone]   To schedule an appointment call [phone]     If you have any questions or need cardiac medication refills, please call the Heart Failure office at [phone], option [phone]. You may also contact the  Heart Failure Nursing team via email at [email or web address] (Please include your name and date of birth).        1) Continue your current medications, including starting metoprolol   2) Monitor your blood pressure and heart rate and call/email in 1-2 weeks  3) Labs (RFP/BNP) in 1-2 weeks  4) Follow up in 3 months at /[facility]

## 2024-09-10 ENCOUNTER — LAB (OUTPATIENT)
Dept: LAB | Facility: LAB | Age: 84
End: 2024-09-10
Payer: MEDICARE

## 2024-09-10 DIAGNOSIS — I50.20 ACC/AHA STAGE B SYSTOLIC HEART FAILURE (MULTI): ICD-10-CM

## 2024-09-10 DIAGNOSIS — E03.9 HYPOTHYROIDISM, UNSPECIFIED TYPE: ICD-10-CM

## 2024-09-10 PROCEDURE — 80053 COMPREHEN METABOLIC PANEL: CPT

## 2024-09-10 PROCEDURE — 84439 ASSAY OF FREE THYROXINE: CPT

## 2024-09-10 PROCEDURE — 84100 ASSAY OF PHOSPHORUS: CPT

## 2024-09-10 PROCEDURE — 84443 ASSAY THYROID STIM HORMONE: CPT

## 2024-09-10 PROCEDURE — 36415 COLL VENOUS BLD VENIPUNCTURE: CPT

## 2024-09-10 PROCEDURE — 83880 ASSAY OF NATRIURETIC PEPTIDE: CPT

## 2024-09-11 LAB
ALBUMIN SERPL BCP-MCNC: 4.2 G/DL (ref 3.4–5)
ALP SERPL-CCNC: 106 U/L (ref 33–136)
ALT SERPL W P-5'-P-CCNC: 20 U/L (ref 7–45)
ANION GAP SERPL CALC-SCNC: 14 MMOL/L (ref 10–20)
AST SERPL W P-5'-P-CCNC: 21 U/L (ref 9–39)
BILIRUB SERPL-MCNC: 0.7 MG/DL (ref 0–1.2)
BNP SERPL-MCNC: 158 PG/ML (ref 0–99)
BUN SERPL-MCNC: 16 MG/DL (ref 6–23)
CALCIUM SERPL-MCNC: 9.1 MG/DL (ref 8.6–10.6)
CHLORIDE SERPL-SCNC: 102 MMOL/L (ref 98–107)
CO2 SERPL-SCNC: 29 MMOL/L (ref 21–32)
CREAT SERPL-MCNC: 0.82 MG/DL (ref 0.5–1.05)
EGFRCR SERPLBLD CKD-EPI 2021: 72 ML/MIN/1.73M*2
GLUCOSE SERPL-MCNC: 85 MG/DL (ref 74–99)
PHOSPHATE SERPL-MCNC: 4.2 MG/DL (ref 2.5–4.9)
POTASSIUM SERPL-SCNC: 4.4 MMOL/L (ref 3.5–5.3)
PROT SERPL-MCNC: 6.4 G/DL (ref 6.4–8.2)
SODIUM SERPL-SCNC: 141 MMOL/L (ref 136–145)
T4 FREE SERPL-MCNC: 1.7 NG/DL (ref 0.78–1.48)
TSH SERPL-ACNC: 0.66 MIU/L (ref 0.44–3.98)

## 2024-09-19 DIAGNOSIS — I42.8 CARDIOMYOPATHY, NONISCHEMIC (MULTI): Primary | ICD-10-CM

## 2024-09-24 DIAGNOSIS — I42.8 CARDIOMYOPATHY, NONISCHEMIC (MULTI): Primary | ICD-10-CM

## 2024-09-24 DIAGNOSIS — I50.20 ACC/AHA STAGE B SYSTOLIC HEART FAILURE: ICD-10-CM

## 2024-09-29 DIAGNOSIS — E03.9 HYPOTHYROIDISM, UNSPECIFIED TYPE: Primary | ICD-10-CM

## 2024-09-29 RX ORDER — LEVOTHYROXINE SODIUM 88 UG/1
88 TABLET ORAL DAILY
Qty: 30 TABLET | Refills: 11 | Status: SHIPPED | OUTPATIENT
Start: 2024-09-29 | End: 2025-09-29

## 2024-10-02 ENCOUNTER — TELEMEDICINE (OUTPATIENT)
Dept: PHARMACY | Facility: HOSPITAL | Age: 84
End: 2024-10-02
Payer: MEDICARE

## 2024-10-02 ENCOUNTER — APPOINTMENT (OUTPATIENT)
Dept: PHARMACY | Facility: HOSPITAL | Age: 84
End: 2024-10-02
Payer: MEDICARE

## 2024-10-02 DIAGNOSIS — I50.20 ACC/AHA STAGE B SYSTOLIC HEART FAILURE: ICD-10-CM

## 2024-10-02 DIAGNOSIS — I42.8 CARDIOMYOPATHY, NONISCHEMIC (MULTI): ICD-10-CM

## 2024-10-02 NOTE — Clinical Note
Velasquez Ambrose,  I spoke with Mallory today about her Entresto/cost assistance program. Patient states she is tolerating Entresto well. Patient reports adherence, no adverse effects and denies s/s of heart failure. Patient states she is NOT taking metoprolol succinate anymore, states she had low BP when taking it.  Patient should qualify for [employer] PAP, will submit application once income documentation is received. Discussed at length wanting to optimize GDMT and potentially starting Jardiance 10mg daily at next follow up visit pending  PAP approval. Patient was hesitant to add on another medication. Plan to follow up in 1 month. Thank you!

## 2024-10-02 NOTE — PROGRESS NOTES
Pharmacist Clinic: Cardiology Management    Mallory Meyer is a 84 y.o. female was referred to Clinical Pharmacy Team for heart failure management.     Referring Provider: Cooper Ambrose, DO    THIS IS A NEW PATIENT APPOINTMENT. PATIENT WILL BE ESTABLISHING CARE WITH CLINICAL PHARMACY.    Appointment was completed by Mallory who was reached at primary number.    Allergies Reviewed? Yes    Allergies   Allergen Reactions    Penicillins Hives and Swelling    Sulfamethoxazole Hives       Past Medical History:   Diagnosis Date    Acute embolism and thrombosis of unspecified deep veins of right lower extremity (Multi) 12/28/2016    Deep vein blood clot of right lower extremity    Acute respiratory failure with hypoxia (Multi) 02/02/2019    Acute hypoxemic respiratory failure    Cellulitis of lower leg 01/20/2024    COVID-19 viremia 01/03/2024    CVA (cerebral vascular accident) (Multi) 08/08/2022    Diverticulosis of intestine, part unspecified, without perforation or abscess without bleeding 12/08/2013    Diverticulosis    Eustachian tube dysfunction 01/20/2010    Other conditions influencing health status     No significant past medical history    Other general symptoms and signs 02/05/2018    Flu-like symptoms    Personal history of diseases of the blood and blood-forming organs and certain disorders involving the immune mechanism 01/10/2019    History of anemia    Personal history of diseases of the blood and blood-forming organs and certain disorders involving the immune mechanism 02/02/2019    History of leukocytosis    Personal history of other drug therapy 08/23/2017    History of influenza vaccination    Personal history of other medical treatment 08/17/2020    History of screening mammography    Phlebitis and thrombophlebitis of lower extremities, unspecified 12/28/2016    Phlebitis and thrombophlebitis of lower extremities    Syncope and collapse 12/29/2017    Near syncope       Current Outpatient  "Medications on File Prior to Visit   Medication Sig Dispense Refill    aspirin 81 mg EC tablet Take 1 tablet (81 mg) by mouth once daily.      atorvastatin (Lipitor) 40 mg tablet Take 1 tablet (40 mg) by mouth once daily. 90 tablet 3    cholecalciferol (Vitamin D-3) 5,000 Units tablet Take 1 tablet (5,000 Units) by mouth once daily.      levothyroxine (Synthroid, Levoxyl) 88 mcg tablet Take 1 tablet (88 mcg) by mouth early in the morning.. Take on an empty stomach at the same time each day, either 30 to 60 minutes prior to breakfast 30 tablet 11    sacubitriL-valsartan (Entresto) 24-26 mg tablet Take 1 tablet by mouth 2 times a day. 180 tablet 3    traZODone (Desyrel) 50 mg tablet Take 1 tablet (50 mg) by mouth once daily at bedtime. 90 tablet 1    metoprolol succinate XL (Toprol-XL) 25 mg 24 hr tablet Take 1 tablet (25 mg) by mouth once daily. Do not crush or chew. (Patient not taking: Reported on 8/27/2024) 90 tablet 3    [DISCONTINUED] levothyroxine (Synthroid, Levoxyl) 100 mcg tablet Take 1 tablet (100 mcg) by mouth once daily. 90 tablet 1     No current facility-administered medications on file prior to visit.         RELEVANT LAB RESULTS  Lab Results   Component Value Date    BILITOT 0.7 09/10/2024    CALCIUM 9.1 09/10/2024    CO2 29 09/10/2024     09/10/2024    CREATININE 0.82 09/10/2024    GLUCOSE 85 09/10/2024    ALKPHOS 106 09/10/2024    K 4.4 09/10/2024    PROT 6.4 09/10/2024     09/10/2024    AST 21 09/10/2024    ALT 20 09/10/2024    BUN 16 09/10/2024    ANIONGAP 14 09/10/2024    MG 2.33 01/02/2024    PHOS 4.2 09/10/2024    ALBUMIN 4.2 09/10/2024    LIPASE 8 (L) 01/02/2024    GFRF 87 08/30/2023     Lab Results   Component Value Date    TRIG 93 02/20/2024    CHOL 116 02/20/2024    LDLCALC 41 02/20/2024    HDL 56.7 02/20/2024     No results found for: \"BMCBC\", \"CBCDIF\"     PHARMACEUTICAL ASSESSMENT    MEDICATION RECONCILIATION    Home Pharmacy Reviewed? Yes, describe: [facility], " [place]     Drug Interactions? No    Medication Documentation Review Audit       Reviewed by Della Andrews, PharmD (Pharmacist) on 10/02/24 at 1041      Medication Order Taking? Sig Documenting Provider Last Dose Status   aspirin 81 mg EC tablet [id number] Yes Take 1 tablet (81 mg) by mouth once daily. Historical Provider, MD Taking Active   atorvastatin (Lipitor) 40 mg tablet [id number] Yes Take 1 tablet (40 mg) by mouth once daily. Wing Del Castillo MD Taking Active   cholecalciferol (Vitamin D-3) 5,000 Units tablet [id number] Yes Take 1 tablet (5,000 Units) by mouth once daily. Historical Provider, MD Taking Active     Discontinued 09/29/24 1453   levothyroxine (Synthroid, Levoxyl) 88 mcg tablet [id number] Yes Take 1 tablet (88 mcg) by mouth early in the morning.. Take on an empty stomach at the same time each day, either 30 to 60 minutes prior to breakfast PING Turner Taking Active   metoprolol succinate XL (Toprol-XL) 25 mg 24 hr tablet [id number] No Take 1 tablet (25 mg) by mouth once daily. Do not crush or chew.   Patient not taking: Reported on 8/27/2024    Wing Del Castillo MD Not Taking Active   sacubitriL-valsartan (Entresto) 24-26 mg tablet [id number] Yes Take 1 tablet by mouth 2 times a day. Cooper Ambrose, DO Taking Active   traZODone (Desyrel) 50 mg tablet [id number] Yes Take 1 tablet (50 mg) by mouth once daily at bedtime. PING Turner Taking Active                    DISEASE MANAGEMENT ASSESSMENT:     CHF ASSESSMENT     Symptom/Staging:  -Most recent ejection fraction: 45-50%      Results for orders placed during the hospital encounter of 06/27/24    Transthoracic Echo (TTE) Complete    Narrative  [facility]  [address] [place] [address]  Tel [phone] Fax [phone]    TRANSTHORACIC ECHOCARDIOGRAM REPORT    Patient Name:      JOYUYEN Drake Physician:    [id number] Moore MD  Study Date:        6/27/2024            Ordering  Provider:    [id number] YUN SIERRA  MRN/PID:           [id number]             Fellow:  Accession#:        [id number]         Nurse:  Date of Birth/Age: 1940 / 83 years Sonographer:          Zamzam Askew RDCS  Gender:            F                    Additional Staff:  Height:            165.10 cm            Admit Date:  Weight:            58.97 kg             Admission Status:     Outpatient  BSA / BMI:         1.65 m2 / 21.63      Department Location:  [facility] Echo Lab  kg/m2  Blood Pressure: 126 /70 mmHg    Study Type:    TRANSTHORACIC ECHO (TTE) COMPLETE  Diagnosis/ICD: Essential (primary) hypertension-I10  Indication:    HTN  CPT Codes:     Echo Complete w Full [id number]    Patient History:  Pertinent History: Dyspnea, Dyslipidemia, Cardiomyopathy, CVA and HTN. LBBB;  previous echocardiogram 11-1-2022.    Study Detail: The following Echo studies were performed: 2D, M-Mode, Doppler and  color flow.      PHYSICIAN INTERPRETATION:  Left Ventricle: The left ventricular systolic function is mildly decreased, with a visually estimated ejection fraction of 45-50%. There is global hypokinesis of the left ventricle with minor regional variations. The left ventricular cavity size is normal. Left ventricular diastolic filling was indeterminate.  Left Atrium: The left atrium is mild to moderately dilated.  Right Ventricle: The right ventricle is normal in size. There is normal right ventricular global systolic function.  Right Atrium: The right atrium is normal in size.  Aortic Valve: The aortic valve is trileaflet. There is minimal aortic valve cusp calcification. There is evidence of mildly elevated transaortic gradients consistent with sclerosis of the aortic valve.  There is mild to moderate aortic valve regurgitation. The peak instantaneous gradient of the aortic valve is 10.2 mmHg.  Mitral Valve: The mitral valve is mildly thickened. There is moderate to severe mitral valve regurgitation which is centrally  directed.  Tricuspid Valve: The tricuspid valve is structurally normal. There is trace tricuspid regurgitation. The Doppler estimated RVSP is within normal limits at 28.6 mmHg.  Pulmonic Valve: The pulmonic valve is structurally normal. There is no indication of pulmonic valve regurgitation.  Pericardium: There is a trivial pericardial effusion.  Aorta: The aortic root is normal.      CONCLUSIONS:  1. The left ventricular systolic function is mildly decreased, with a visually estimated ejection fraction of 45-50%.  2. There is global hypokinesis of the left ventricle with minor regional variations.  3. Left ventricular diastolic filling was indeterminate.  4. There is normal right ventricular global systolic function.  5. The left atrium is mild to moderately dilated.  6. Moderate to severe mitral valve regurgitation.  7. RVSP within normal limits.  8. Aortic valve sclerosis.  9. Mild to moderate aortic valve regurgitation.  10. MV inflow velocites are difficult to interpret with the signifincant MR. They suggest high LA pressures.  11. The MR jet is wide and by LA area appears severe however pulmonary vein flow reversal was not seen.    QUANTITATIVE DATA SUMMARY:  2D MEASUREMENTS:  Normal Ranges:  LAs:           4.10 cm   (2.7-4.0cm)  IVSd:          0.80 cm   (0.6-1.1cm)  LVPWd:         0.80 cm   (0.6-1.1cm)  LVIDd:         5.10 cm   (3.9-5.9cm)  LVIDs:         3.90 cm  LV Mass Index: 85.3 g/m2  LV % FS        23.5 %    LA VOLUME:  Normal Ranges:  LA Vol A4C:        14.5 ml    (22+/-6mL/m2)  LA Vol Index A4C:  8.8ml/m2  LA Area A4C:       17.1 cm2  LA Area A2C:       15.8 cm2  LA Major Axis A4C: 17.1 cm  LA Volume Index:   22.4 ml/m2    M-MODE MEASUREMENTS:  Normal Ranges:  Ao Root: 2.70 cm (2.0-3.7cm)    AORTA MEASUREMENTS:  Normal Ranges:  Ao Sinus, d: 2.80 cm (2.1-3.5cm)  Ao STJ, d:   2.00 cm (1.7-3.4cm)  Asc Ao, d:   2.70 cm (2.1-3.4cm)    LV SYSTOLIC FUNCTION BY 2D PLANIMETRY (MOD):  Normal Ranges:  EF-A4C  View:    37 % (>=55%)  EF-A2C View:    62 %  EF-Biplane:     49 %  EF-Visual:      48 %  LV EF Reported: 48 %    LV DIASTOLIC FUNCTION:  Normal Ranges:  MV Peak E:        1.29 m/s    (0.7-1.2 m/s)  MV Peak A:        0.88 m/s    (0.42-0.7 m/s)  E/A Ratio:        1.47        (1.0-2.2)  MV e'             0.089 m/s   (>8.0)  MV lateral e'     0.11 m/s  MV medial e'      0.07 m/s  E/e' Ratio:       14.49       (<8.0)  PulmV Sys Montrell:    33.50 cm/s  PulmV Mcneil Montrell:   85.90 cm/s  PulmV S/D Montrell:    0.40  PulmV A Revs Montrell: 25.70 cm/s  PulmV A Revs Dur: 162.00 msec    MITRAL VALVE:  Normal Ranges:  MV Vmax:    1.29 m/s (<=1.3m/s)  MV peak P.7 mmHg (<5mmHg)  MV mean PG: 3.0 mmHg (<48mmHg)  MV DT:      261 msec (150-240msec)    AORTIC VALVE:  Normal Ranges:  AoV Vmax:      1.60 m/s  (<=1.7m/s)  AoV Peak PG:   10.2 mmHg (<20mmHg)  LVOT Max Montrell:  1.17 m/s  (<=1.1m/s)  LVOT VTI:      26.10 cm  LVOT Diameter: 1.70 cm   (1.8-2.4cm)  AoV Area,Vmax: 1.66 cm2  (2.5-4.5cm2)    AORTIC INSUFFICIENCY:  AI Vmax:       4.34 m/s  AI Half-time:  471 msec  AI Decel Rate: 258.50 cm/s2      RIGHT VENTRICLE:  RV Basal 2.60 cm  RV Mid   2.10 cm  RV Major 6.4 cm  TAPSE:   18.7 mm  RV s'    0.11 m/s    TRICUSPID VALVE/RVSP:  Normal Ranges:  Peak TR Velocity: 2.53 m/s  RV Syst Pressure: 28.6 mmHg (< 30mmHg)    PULMONIC VALVE:  Normal Ranges:  PV Accel Time: 123 msec (>120ms)  PV Max Montrell:    0.9 m/s  (0.6-0.9m/s)  PV Max PG:     3.5 mmHg    Pulmonary Veins:  PulmV A Revs Dur: 162.00 msec  PulmV A Revs Montrell: 25.70 cm/s  PulmV Mcneil Montrell:   85.90 cm/s  PulmV S/D Montrell:    0.40  PulmV Sys Montrell:    33.50 cm/s      [id number] Jim Moore MD  Electronically signed on 2024 at 9:12:46 AM        ** Final **      Guideline-Directed Medical Therapy:  -ARNI: Yes, describe: Entresto 24/26mg twice daily  -Beta Blocker: Yes, describe: Metoprolol succinate 25mg daily (prescribed but not taking)  -MRA: No  -SGLT2i: No    Secondary Prevention:  -The ASCVD Risk score  (Tiffany WOODS, et al., 2019) failed to calculate for the following reasons:    The 2019 ASCVD risk score is only valid for ages 40 to 79    The patient has a prior MI or stroke diagnosis   -Aspirin 81mg? yes  -Statin?: Yes, describe: Atorvastatin 40mg daily  -HTN?: Yes, describe: controlled at last OV    CURRENT PHARMACOTHERAPY:   Entresto 24/26mg twice daily  Metoprolol succinate 25mg daily (prescribed but not taking)    Affordability:  PAP screen  Adherence/Compliance: reports adherence   Adverse Effects: none reported    Monitoring Weights at Home: No- denies weight fluctuation  Home Weight Recordings: 130lbs    Past In Office Weight Readings:   Wt Readings from Last 6 Encounters:   08/27/24 59 kg (130 lb)   07/09/24 60.5 kg (133 lb 6.4 oz)   05/07/24 59 kg (130 lb)   01/25/24 59.4 kg (131 lb)   01/08/24 63.5 kg (140 lb)   01/03/24 63.8 kg (140 lb 10.5 oz)       Monitoring Blood Pressure at Home: Yes; daily  Home BP Recordings: 104/60-65    Past In Office BP Readings:   BP Readings from Last 6 Encounters:   08/27/24 123/67   07/09/24 130/54   05/07/24 126/70   01/25/24 118/58   01/08/24 110/60   01/05/24 134/65       HEALTH MANAGEMENT    Maintaining fluid restriction (<2 L/day): No  Edema/swelling: No  Shortness of breath: Yes- on exertion i.e walking long distance  Trouble sleeping/lying down: No  Dry/hacking cough: No  Recent Hospitalizations: No    EDUCATION/COUNSELING:   - Counseled patient on MOA, expectations, duration of therapy, contraindications, administration, and monitoring parameters  - Counseled patient on lifestyle modifications that can decrease your risk of having complications (smoking cessation, losing weight, daily weights, vaccines)  - Counseled patient on fluid intake and weight management. Recommended to not consume more than 2 liters of fliuids per day. If they have gained more than 2-3 pounds within a 24 hours period (or 5 pounds in a week), contact their cardiologist  - Answered all  patient questions and concerns       DISCUSSION/NOTES:   Patient medications and allergies reviewed and updated. Patient is NOT taking metoprolol succinate anymore, states she had low blood pressure when taking it.  Patient states she is tolerating Entresto well. Patient reports adherence, no adverse effects and denies s/s of heart failure. Patient reports becoming short of breath on exertion, for example when she walks long distances.  Patient was screened for  PAP, she reports paying $84/90 day supply of Entresto. Plan to submit  PAP application once income documentation is received.  Discussed at length wanting to optimize GDMT and potentially starting Jardiance 10mg daily at next follow up visit pending  PAP approval. Patient is hesistant to add on another medication. Plan to continue current medication regimen, will communicate to Dr. Ambrose patient has stopped metoprolol succinate.    ASSESSMENT AND PLAN:     Patient Assistance Program (PAP)    Application for program to be submitted for the following medications: [facility] Permanent Address: [facility]   Prescription Insurance:   Yes   Members of Household: 1   Files Taxes: No       Patient will be faxing financial information to pharmacist directly at [phone].    Patient verbally reports monthly or yearly income which is less than 400% federal poverty level    Patient aware this process may take up to 6 weeks.     If approved medication must be filled through [facility] and MEDICATION WILL BE MAILED TO PATIENT.         Assessment/Plan   Problem List Items Addressed This Visit       Cardiomyopathy, nonischemic (Multi)     Patient currently on 1 of 4 GDMT medications. Renal function and potassium appropriate to continue on Entresto 24/26mg twice daily. Reported BP readings: 104/60-65.          Other Visit Diagnoses       ACC/AHA stage B systolic heart failure        Relevant Orders    Follow Up In Clinical Pharmacy               RECOMMENDATIONS/PLAN    CONTINUE  Entresto 24/26mg twice daily  Follow up in 1 month    Last Appnt with Referring Provider: 08/27/2024  Next Appnt with Referring Provider: 11/04/2024  Clinical Pharmacist follow up: 1 month  VAF/Application Expiration: pending  Type of Encounter: Orestes Andrews, PharmD    Verbal consent to manage patient's drug therapy was obtained from the patient . They were informed they may decline to participate or withdraw from participation in pharmacy services at any time.    Continue all meds under the continuation of care with the referring provider and clinical pharmacy team.

## 2024-10-02 NOTE — ASSESSMENT & PLAN NOTE
Patient currently on 1 of 4 GDMT medications. Renal function and potassium appropriate to continue on Entresto 24/26mg twice daily. Reported BP readings: 104/60-65.

## 2024-10-11 DIAGNOSIS — I50.20 ACC/AHA STAGE B SYSTOLIC HEART FAILURE: Primary | ICD-10-CM

## 2024-10-11 RX ORDER — SACUBITRIL AND VALSARTAN 24; 26 MG/1; MG/1
1 TABLET, FILM COATED ORAL 2 TIMES DAILY
Qty: 180 TABLET | Refills: 3 | Status: SHIPPED | OUTPATIENT
Start: 2024-10-11

## 2024-10-21 ENCOUNTER — TELEPHONE (OUTPATIENT)
Dept: PHARMACY | Facility: HOSPITAL | Age: 84
End: 2024-10-21

## 2024-11-04 ENCOUNTER — APPOINTMENT (OUTPATIENT)
Dept: CARDIOLOGY | Facility: CLINIC | Age: 84
End: 2024-11-04
Payer: MEDICARE

## 2024-11-04 VITALS
HEIGHT: 65 IN | WEIGHT: 130 LBS | DIASTOLIC BLOOD PRESSURE: 68 MMHG | SYSTOLIC BLOOD PRESSURE: 134 MMHG | BODY MASS INDEX: 21.66 KG/M2 | HEART RATE: 67 BPM | OXYGEN SATURATION: 98 %

## 2024-11-04 DIAGNOSIS — I34.0 NONRHEUMATIC MITRAL VALVE REGURGITATION: ICD-10-CM

## 2024-11-04 DIAGNOSIS — I50.20 ACC/AHA STAGE B SYSTOLIC HEART FAILURE: Primary | ICD-10-CM

## 2024-11-04 DIAGNOSIS — I42.8 NONISCHEMIC CARDIOMYOPATHY (MULTI): ICD-10-CM

## 2024-11-04 PROCEDURE — 1036F TOBACCO NON-USER: CPT | Performed by: INTERNAL MEDICINE

## 2024-11-04 PROCEDURE — 99214 OFFICE O/P EST MOD 30 MIN: CPT | Performed by: INTERNAL MEDICINE

## 2024-11-04 PROCEDURE — 3078F DIAST BP <80 MM HG: CPT | Performed by: INTERNAL MEDICINE

## 2024-11-04 PROCEDURE — 1157F ADVNC CARE PLAN IN RCRD: CPT | Performed by: INTERNAL MEDICINE

## 2024-11-04 PROCEDURE — 1160F RVW MEDS BY RX/DR IN RCRD: CPT | Performed by: INTERNAL MEDICINE

## 2024-11-04 PROCEDURE — 1159F MED LIST DOCD IN RCRD: CPT | Performed by: INTERNAL MEDICINE

## 2024-11-04 PROCEDURE — 3075F SYST BP GE 130 - 139MM HG: CPT | Performed by: INTERNAL MEDICINE

## 2024-11-04 RX ORDER — LOSARTAN POTASSIUM 25 MG/1
25 TABLET ORAL DAILY
Qty: 30 TABLET | Refills: 11 | Status: SHIPPED | OUTPATIENT
Start: 2024-11-04 | End: 2025-11-04

## 2024-11-04 NOTE — PATIENT INSTRUCTIONS
It was a pleasure seeing you today. Please contact myself or my team with any questions.     To reach Dr. Ambrose' office please call [phone] (Deepak).   Fax: [phone]   To schedule an appointment call [phone]     If you have any questions or need cardiac medication refills, please call the Heart Failure office at [phone], option 6. You may also contact the  Heart Failure Nursing team via email at [email or web address] (Please include your name and date of birth).        1) Resume losartan 25 mg daily  2) We will do a JESSE (ultrasound down the throat) to look at your leaky heart valve. To schedule, call [phone].   3) Follow up in 6 months at /[facility]

## 2024-11-04 NOTE — PROGRESS NOTES
[facility] Advanced [facility] Failure [facility]  Primary Care Physician: Melania Raya, APRN-CNP  Referring Provider/Cardiologist: Magdalene (/[facility])    Date of Visit: 2024  2:20 PM EST  Location of visit: [address]     HPI:   Ms. 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     Interval Hx:  She did not tolerate entresto; even low dose () due to orthostatic hypotension. She did not resume losartan.     She currently denies chest pain, pressure, or palpitations.      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 Pt states she drinks 1 cocktail daily.     FamHx:  Father  at the age of 48 due to the MI      Current Outpatient Medications   Medication Sig Dispense Refill    aspirin 81 mg EC tablet Take 1 tablet (81 mg) by mouth once daily.      atorvastatin (Lipitor) 40 mg tablet Take 1 tablet (40 mg) by mouth once daily. 90 tablet 3    cholecalciferol (Vitamin D-3) 5,000 Units tablet Take 1 tablet (5,000 Units) by mouth once daily.      levothyroxine (Synthroid, Levoxyl) 88 mcg tablet Take 1 tablet (88 mcg) by mouth early in the morning.. Take on an empty stomach at the same time each day, either 30 to 60 minutes prior to breakfast 30 tablet 11    metoprolol succinate XL (Toprol-XL) 25 mg 24 hr tablet Take 1 tablet (25 mg) by mouth once daily. Do not crush or chew. (Patient not taking: Reported on 2024) 90 tablet 3    sacubitriL-valsartan (Entresto) 24-26 mg tablet Take 1 tablet by mouth 2 times a day. 180 tablet 3    traZODone (Desyrel) 50 mg tablet Take 1 tablet (50 mg) by mouth once daily at bedtime. 90 tablet  "1     No current facility-administered medications for this visit.       Allergies   Allergen Reactions    Penicillins Hives and Swelling    Sulfamethoxazole Hives       Visit Vitals  /68 (BP Location: Left arm, Patient Position: Sitting)   Pulse 67   Ht 1.651 m (5' 5\")   Wt 59 kg (130 lb)   SpO2 98%   BMI 21.63 kg/m²   OB Status Postmenopausal   Smoking Status Never   BSA 1.64 m²        Physical Exam:  On exam Ms. Meyer appears her stated age, is alert and oriented x3, and in no acute distress. Her sclera are anicteric and her oropharynx has moist mucous membranes. Her neck is supple and without thyromegaly. The JVP is ~5 cm of water above the right atrium. Her cardiac exam has regular rhythm, normal S1, S2. No S3/4. There are no audible murmurs. Her lungs are clear to auscultation bilaterally and there is no dullness to percussion. Her abdomen is soft, nontender with normoactive bowel sounds. There is no HJR. The extremities are warm and without sig edema. The skin is dry. There is no rash present. The distal pulses are 2+ in all four extremities. Her mood and affect are appropriate for todays encounter.       Cardiac Labs/Diagnostics:    Lab Results   Component Value Date    CREATININE 0.82 09/10/2024    BUN 16 09/10/2024     09/10/2024    K 4.4 09/10/2024     09/10/2024    CO2 29 09/10/2024        Recent Labs     02/20/24  1339 02/16/23  1330 01/03/22  1304   CHOL 116 116 225*   LDLF  --  32 135*   LDLCALC 41  --   --    HDL 56.7 66.5 60.0   TRIG 93 87 149       ECG (8/27/24):  Sinus rhythm (HR 75), LBBB    Echo (6/27/24):  1. The left ventricular systolic function is mildly decreased, with a visually estimated ejection fraction of 45-50%.  2. There is global hypokinesis of the left ventricle with minor regional variations.  3. Left ventricular diastolic filling was indeterminate.  4. There is normal right ventricular global systolic function.  5. The left atrium is mild to moderately " dilated.  6. Moderate to severe mitral valve regurgitation.  7. RVSP within normal limits.  8. Aortic valve sclerosis.  9. Mild to moderate aortic valve regurgitation.  10. MV inflow velocites are difficult to interpret with the signifincant MR. They suggest high LA pressures.  11. The MR jet is wide and by LA area appears severe however pulmonary vein flow reversal was not seen.    Echo (11/1/22):  1. Left ventricular systolic function is mildly decreased with a 45-50% estimated ejection fraction.  2. Spectral Doppler shows an impaired relaxation pattern of left ventricular diastolic filling.  3. Moderate mitral valve regurgitation.  4. Mild to moderate aortic valve regurgitation.  5. There is global hypokinesis of the left ventricle with minor regional variations.    Echo (2/25/22):  1. The left ventricular systolic function is normal with a 55-60% estimated ejection fraction.  2. Spectral Doppler shows an abnormal pattern of left ventricular diastolic filling.  3. The left atrium is mild to moderately dilated.  4. Moderate to severe mitral valve regurgitation.  5. There is mild to moderate aortic valve regurgitation.    Nuclear stress (2/15/22):  1. No nuclear evidence of pharmacologic stress-induced ischemia or scar. Anterior soft tissue attenuation suggested.  2. Normal LV function with abnormal septal motion consistent with left bundle branch block. EF 71%.  3. Based on these findings low likelihood of flow-limiting coronary artery disease.      Impression/Plan:  Ms. 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     1) Stage B systolic HF/NICM/HFmrEF (LVEF 45-50%; 6/2024) with associated mitral valve regurgitation and LBBB  First noted to have LV dysfunction on an echocardiogram in 11/2022 (prior echo 2/2022  with preserved function). In addition her echo notes moderate to severe mitral regurgitation. A nuclear stress test performed showed no evidence of ischemia or infarct. She did not tolerate entresto, and unfortunately did not resume losartan. Will resume her ARB and will proceed with a JESES to evaluate her MR as she is on maximally tolerated GDMT for HFmrEF.    -c/w metoprolol succinate 25 mg daily  -resume losartan 25 mg daily  -will refer for a JESSE to assess her MR; pending the results, will discuss a referral to the Structural heart team re: Amy      F/U: 6 months at /[facility]      Wing,  Thank you for referring Ms. Meyer to the  Advanced Heart Failure Clinic. Please let me know if you have any questions.       ____________________________________________________________  Cooper Ambrose DO  Section of Advanced Heart Failure and Cardiac Transplantation  Division of Cardiovascular Medicine  [facility] [place]  [facility]

## 2024-11-12 ENCOUNTER — APPOINTMENT (OUTPATIENT)
Dept: PHARMACY | Facility: HOSPITAL | Age: 84
End: 2024-11-12
Payer: MEDICARE

## 2024-11-13 DIAGNOSIS — I50.20 ACC/AHA STAGE B SYSTOLIC HEART FAILURE: Primary | ICD-10-CM

## 2024-11-27 DIAGNOSIS — I42.8 CARDIOMYOPATHY, NONISCHEMIC (MULTI): Primary | ICD-10-CM

## 2024-12-30 ENCOUNTER — TELEPHONE (OUTPATIENT)
Dept: PRIMARY CARE | Facility: CLINIC | Age: 84
End: 2024-12-30

## 2025-01-02 ENCOUNTER — HOSPITAL ENCOUNTER (OUTPATIENT)
Dept: RADIOLOGY | Facility: CLINIC | Age: 85
Discharge: HOME | End: 2025-01-02
Payer: MEDICARE

## 2025-01-02 DIAGNOSIS — Z12.31 SCREENING MAMMOGRAM FOR BREAST CANCER: ICD-10-CM

## 2025-01-02 DIAGNOSIS — M85.80 OSTEOPENIA, UNSPECIFIED LOCATION: ICD-10-CM

## 2025-01-02 DIAGNOSIS — M81.0 SENILE OSTEOPOROSIS: ICD-10-CM

## 2025-01-02 PROCEDURE — 77063 BREAST TOMOSYNTHESIS BI: CPT

## 2025-01-02 PROCEDURE — 77080 DXA BONE DENSITY AXIAL: CPT

## 2025-01-02 PROCEDURE — 77080 DXA BONE DENSITY AXIAL: CPT | Performed by: RADIOLOGY

## 2025-01-15 ENCOUNTER — APPOINTMENT (OUTPATIENT)
Dept: PRIMARY CARE | Facility: CLINIC | Age: 85
End: 2025-01-15
Payer: MEDICARE

## 2025-01-15 ENCOUNTER — OFFICE VISIT (OUTPATIENT)
Dept: PRIMARY CARE | Facility: CLINIC | Age: 85
End: 2025-01-15
Payer: MEDICARE

## 2025-01-15 VITALS
HEIGHT: 65 IN | DIASTOLIC BLOOD PRESSURE: 78 MMHG | BODY MASS INDEX: 21.99 KG/M2 | WEIGHT: 132 LBS | SYSTOLIC BLOOD PRESSURE: 136 MMHG | OXYGEN SATURATION: 98 % | HEART RATE: 51 BPM

## 2025-01-15 DIAGNOSIS — Z00.00 ENCOUNTER FOR MEDICARE ANNUAL WELLNESS EXAM: ICD-10-CM

## 2025-01-15 DIAGNOSIS — R53.83 OTHER FATIGUE: ICD-10-CM

## 2025-01-15 DIAGNOSIS — I10 HYPERTENSION, UNSPECIFIED TYPE: ICD-10-CM

## 2025-01-15 DIAGNOSIS — M79.18 LEFT BUTTOCK PAIN: ICD-10-CM

## 2025-01-15 DIAGNOSIS — E55.9 VITAMIN D DEFICIENCY: ICD-10-CM

## 2025-01-15 DIAGNOSIS — E03.9 HYPOTHYROIDISM, UNSPECIFIED TYPE: ICD-10-CM

## 2025-01-15 DIAGNOSIS — K75.9 HEPATITIS: ICD-10-CM

## 2025-01-15 DIAGNOSIS — M85.80 OSTEOPENIA, UNSPECIFIED LOCATION: ICD-10-CM

## 2025-01-15 DIAGNOSIS — E78.5 DYSLIPIDEMIA: ICD-10-CM

## 2025-01-15 DIAGNOSIS — I50.32 CHRONIC DIASTOLIC (CONGESTIVE) HEART FAILURE: Primary | ICD-10-CM

## 2025-01-15 DIAGNOSIS — Z00.00 HEALTH CARE MAINTENANCE: ICD-10-CM

## 2025-01-15 DIAGNOSIS — G47.00 INSOMNIA, UNSPECIFIED TYPE: ICD-10-CM

## 2025-01-15 PROBLEM — R25.2 SPASM: Status: RESOLVED | Noted: 2024-01-20 | Resolved: 2025-01-15

## 2025-01-15 PROBLEM — I69.321: Status: RESOLVED | Noted: 2023-07-01 | Resolved: 2025-01-15

## 2025-01-15 PROBLEM — R94.31 ABNORMAL ECG: Status: RESOLVED | Noted: 2023-07-01 | Resolved: 2025-01-15

## 2025-01-15 PROBLEM — I63.9 CVA (CEREBRAL VASCULAR ACCIDENT) (MULTI): Status: RESOLVED | Noted: 2023-07-01 | Resolved: 2025-01-15

## 2025-01-15 LAB
APPEARANCE UR: CLEAR
BACTERIA #/AREA URNS AUTO: ABNORMAL /HPF
BILIRUB UR STRIP.AUTO-MCNC: NEGATIVE MG/DL
COLOR UR: ABNORMAL
GLUCOSE UR STRIP.AUTO-MCNC: NORMAL MG/DL
KETONES UR STRIP.AUTO-MCNC: NEGATIVE MG/DL
LEUKOCYTE ESTERASE UR QL STRIP.AUTO: ABNORMAL
MUCOUS THREADS #/AREA URNS AUTO: ABNORMAL /LPF
NITRITE UR QL STRIP.AUTO: NEGATIVE
PH UR STRIP.AUTO: 5.5 [PH]
PROT UR STRIP.AUTO-MCNC: NEGATIVE MG/DL
RBC # UR STRIP.AUTO: NEGATIVE /UL
RBC #/AREA URNS AUTO: ABNORMAL /HPF
SP GR UR STRIP.AUTO: 1.02
UROBILINOGEN UR STRIP.AUTO-MCNC: NORMAL MG/DL
WBC #/AREA URNS AUTO: ABNORMAL /HPF

## 2025-01-15 PROCEDURE — 99214 OFFICE O/P EST MOD 30 MIN: CPT | Performed by: NURSE PRACTITIONER

## 2025-01-15 PROCEDURE — G0439 PPPS, SUBSEQ VISIT: HCPCS | Performed by: NURSE PRACTITIONER

## 2025-01-15 PROCEDURE — 81001 URINALYSIS AUTO W/SCOPE: CPT

## 2025-01-15 PROCEDURE — 1158F ADVNC CARE PLAN TLK DOCD: CPT | Performed by: NURSE PRACTITIONER

## 2025-01-15 PROCEDURE — 1036F TOBACCO NON-USER: CPT | Performed by: NURSE PRACTITIONER

## 2025-01-15 PROCEDURE — 1170F FXNL STATUS ASSESSED: CPT | Performed by: NURSE PRACTITIONER

## 2025-01-15 PROCEDURE — 99397 PER PM REEVAL EST PAT 65+ YR: CPT | Performed by: NURSE PRACTITIONER

## 2025-01-15 PROCEDURE — 1157F ADVNC CARE PLAN IN RCRD: CPT | Performed by: NURSE PRACTITIONER

## 2025-01-15 PROCEDURE — 1123F ACP DISCUSS/DSCN MKR DOCD: CPT | Performed by: NURSE PRACTITIONER

## 2025-01-15 PROCEDURE — 3075F SYST BP GE 130 - 139MM HG: CPT | Performed by: NURSE PRACTITIONER

## 2025-01-15 PROCEDURE — 3078F DIAST BP <80 MM HG: CPT | Performed by: NURSE PRACTITIONER

## 2025-01-15 PROCEDURE — 1160F RVW MEDS BY RX/DR IN RCRD: CPT | Performed by: NURSE PRACTITIONER

## 2025-01-15 PROCEDURE — 1159F MED LIST DOCD IN RCRD: CPT | Performed by: NURSE PRACTITIONER

## 2025-01-15 RX ORDER — LEVOTHYROXINE SODIUM 88 UG/1
88 TABLET ORAL DAILY
Qty: 90 TABLET | Refills: 1 | Status: SHIPPED | OUTPATIENT
Start: 2025-01-15

## 2025-01-15 RX ORDER — TRAZODONE HYDROCHLORIDE 50 MG/1
50 TABLET ORAL NIGHTLY
Qty: 90 TABLET | Refills: 1 | Status: SHIPPED | OUTPATIENT
Start: 2025-01-15

## 2025-01-15 ASSESSMENT — ACTIVITIES OF DAILY LIVING (ADL)
GROCERY_SHOPPING: INDEPENDENT
BATHING: INDEPENDENT
DOING_HOUSEWORK: INDEPENDENT
MANAGING_FINANCES: INDEPENDENT
TAKING_MEDICATION: INDEPENDENT
DRESSING: INDEPENDENT

## 2025-01-15 NOTE — PROGRESS NOTES
Annual Comprehensive Medical Exam:    84 y.o. female presents for full exam,  MWV and hospital follow up.       Diet:  healthy    meals on wheels 3 days a week  Caffeine per day: 1  Water per day: 8 ounces  Exercise: regularly, walking and dancing    Alcohol: 1 per day  Tobacco: denies  Dentist:  upper and lower dentures  Optometrist:   yearly   Pap/Pelvic: aged out   Mammogram: 1/2/25  Dexa: 10/3/20, 1/2/25 osteopenia   Colonoscopy:   aged out    Allowed to report any questions or concerns.    CVA 8/22 Ted Pierre     Hyperlipidemia: Cardio Dr Cooper Ambrose   Med: Lipitor   Tolerating without side effects    Hypertension, Stage B systolic HF/NICM/HFmrEF,  LBBB, Non rheumatic Mitral Valve Disorder, Cardiac Arrhythmia: Cardio Dr Cooper Ambrose   Med: Asa 81 mg, Toprol XL 25 mg daily, Losartan 25 mg daily  Loop recorder completed   wants to do a JESSE--she is not sure she wants to complete this test  States she is able to perform her ADLS, dance etc without CP or SOB. Does c/o fatigue but she relates some of that to her age.     Hypothyroidism:  Med: Levothyroxine  Tolerating without side effects    Insomnia:   Med: Trazodone  Works well and able to function the next day    Osteopenia:   Dexa 1/2/25    C/o Left mid buttocks discomfort  Started 5 weeks ago  No injury  No radiation of pain. No N/T  No changes in bowel, bladder or saddle anesthesia.   Med: Tylenol daily, Motrin 200 mg (1 time)  Stretching in AM  She was able to walk around the art museum   She  was able to complete her jazz dancing  Both activities did increase pain     Past Medical History:   Diagnosis Date    Acute embolism and thrombosis of unspecified deep veins of right lower extremity (Multi) 12/28/2016    Deep vein blood clot of right lower extremity    Acute respiratory failure with hypoxia (Multi) 02/02/2019    Acute hypoxemic respiratory failure    Cellulitis of lower leg 01/20/2024    COVID-19 viremia 01/03/2024    CVA (cerebral  vascular accident) (Multi) 08/08/2022    Diverticulosis of intestine, part unspecified, without perforation or abscess without bleeding 12/08/2013    Diverticulosis    Eustachian tube dysfunction 01/20/2010    Other conditions influencing health status     No significant past medical history    Other general symptoms and signs 02/05/2018    Flu-like symptoms    Personal history of diseases of the blood and blood-forming organs and certain disorders involving the immune mechanism 01/10/2019    History of anemia    Personal history of diseases of the blood and blood-forming organs and certain disorders involving the immune mechanism 02/02/2019    History of leukocytosis    Personal history of other drug therapy 08/23/2017    History of influenza vaccination    Personal history of other medical treatment 08/17/2020    History of screening mammography    Phlebitis and thrombophlebitis of lower extremities, unspecified 12/28/2016    Phlebitis and thrombophlebitis of lower extremities    Syncope and collapse 12/29/2017    Near syncope      Past Surgical History:   Procedure Laterality Date    OTHER SURGICAL HISTORY  06/27/2022    Colonoscopy     Family History   Problem Relation Name Age of Onset    Breast cancer Mother      Lung cancer Mother      Other (malignant neoplasm of breast) Mother      Hyperlipidemia Father      Hyperlipidemia Brother      Other (malignant neoplasm of prostate) Son        Social History     Socioeconomic History    Marital status:      Spouse name: Not on file    Number of children: Not on file    Years of education: Not on file    Highest education level: Not on file   Occupational History    Not on file   Tobacco Use    Smoking status: Never    Smokeless tobacco: Never   Substance and Sexual Activity    Alcohol use: Yes     Comment: 7 per week (1 drink per day)    Drug use: Never    Sexual activity: Not on file   Other Topics Concern    Not on file   Social History Narrative    Not on  file     Social Drivers of Health     Financial Resource Strain: Low Risk  (1/2/2024)    Overall Financial Resource Strain (CARDIA)     Difficulty of Paying Living Expenses: Not hard at all   Food Insecurity: Not on file   Transportation Needs: No Transportation Needs (1/18/2024)    OASIS : Transportation     Lack of Transportation (Medical): No     Lack of Transportation (Non-Medical): No     Patient Unable or Declines to Respond: No   Physical Activity: Not on file   Stress: Not on file   Social Connections: Feeling Socially Integrated (1/18/2024)    OASIS : Social Isolation     Frequency of experiencing loneliness or isolation: Never   Intimate Partner Violence: Not on file   Housing Stability: Low Risk  (1/2/2024)    Housing Stability Vital Sign     Unable to Pay for Housing in the Last Year: No     Number of Places Lived in the Last Year: 1     Unstable Housing in the Last Year: No       Current Outpatient Medications on File Prior to Visit   Medication Sig Dispense Refill    aspirin 81 mg EC tablet Take 1 tablet (81 mg) by mouth once daily.      atorvastatin (Lipitor) 40 mg tablet Take 1 tablet (40 mg) by mouth once daily. 90 tablet 3    cholecalciferol (Vitamin D-3) 5,000 Units tablet Take 1 tablet (5,000 Units) by mouth once daily.      levothyroxine (Synthroid, Levoxyl) 88 mcg tablet Take 1 tablet (88 mcg) by mouth early in the morning.. Take on an empty stomach at the same time each day, either 30 to 60 minutes prior to breakfast 30 tablet 11    losartan (Cozaar) 25 mg tablet Take 1 tablet (25 mg) by mouth once daily. 30 tablet 11    metoprolol succinate XL (Toprol-XL) 25 mg 24 hr tablet Take 1 tablet (25 mg) by mouth once daily. Do not crush or chew. 90 tablet 3    traZODone (Desyrel) 50 mg tablet Take 1 tablet (50 mg) by mouth once daily at bedtime. 90 tablet 1     No current facility-administered medications on file prior to visit.       Allergies   Allergen Reactions    Penicillins Hives and  "Swelling    Sulfamethoxazole Hives     Visit Vitals  /78   Pulse 51   Ht 1.651 m (5' 5\")   Wt 59.9 kg (132 lb)   SpO2 98%   BMI 21.97 kg/m²   OB Status Postmenopausal   Smoking Status Never   BSA 1.66 m²         Physical Exam  Gen: Alert and oriented x3 female in no acute distress.  HEENT: Head is normocephalic.  Pupils equal and reactive to light.  Neck is supple without carotid bruits.  Heart: Regular rate and rhythm without murmurs.  Lungs: Clear to auscultation bilaterally.  Breast:    declined  Pelvic:      n/a  Abdomen: Soft with normal bowel sounds.  No masses or pain to palpation.    Extremities: Good range of motion of all joints.  No significant edema. Pedal pulses +1-2/4  Neuro: No signs of focal neurologic deficit.  No tremor.  Speech and hearing are normal.  DTRs +3/4;  Muscle Strength +5/5.  Musculoskeletal: Spine with good ROM.  Leg lengths are equal.  Skin: No significant or irregular nevi visualized.  Psych: normal affect.  No suicidal ideation.  Good judgment and insight.    Diagnosis/Plan:     1. Encounter for Medicare annual wellness exam      2. Health care maintenance      3. Hypothyroidism, unspecified type  Stable.  Continue current medications.  - Comprehensive metabolic panel; Future  - CBC; Future  - Lipid panel; Future  - TSH; Future  - Thyroxine, Free; Future  - levothyroxine (Synthroid, Levoxyl) 88 mcg tablet; Take 1 tablet (88 mcg) by mouth early in the morning.. Take on an empty stomach at the same time each day, either 30 to 60 minutes prior to breakfast  Dispense: 90 tablet; Refill: 1    4. Insomnia, unspecified type  Stable.  Continue current medications.  - traZODone (Desyrel) 50 mg tablet; Take 1 tablet (50 mg) by mouth once daily at bedtime.  Dispense: 90 tablet; Refill: 1    5. Left buttock pain  Discussed stretching in AM and PM  Apply muscle rub, lidocaine rub or pain patch.   May use short course of Motrin 200 mg (this is most she will take) 3 times a day with food for " 1 week  Avoid jazz class this week   Please update if pain not improving     6. Chronic diastolic (congestive) heart failure (Primary)  Follow-up with specialist per their discretion/direction.     7. Hypertension, unspecified type  Follow-up with specialist per their discretion/direction.   - Comprehensive metabolic panel; Future  - CBC; Future  - Lipid panel; Future  - Urinalysis with Reflex Microscopic; Future  - Urinalysis with Reflex Microscopic    8. Dyslipidemia  Follow-up with specialist per their discretion/direction.   - Comprehensive metabolic panel; Future  - CBC; Future  - Lipid panel; Future    9. Vitamin D deficiency  Vitamin D lab ordered. If your level is low,  a script for a weekly dose of Vitamin D will be sent to pharmacy. You should start or continue a Multivitamin.  - Vitamin D 25-Hydroxy,Total (for eval of Vitamin D levels); Future    11. Osteopenia, unspecified location  Patient should start/continue taking Calcium 1200 mg and Vit D  daily with food. Perform balance training, strength training and weight bearing exercises. Cessation of smoking and moderation of intake of alcohol, caffeine and carbonated beverages are recommended. Repeat DEXA in 2- years.      Medications refills will be completed as discussed.     Any labs or testing that is ordered will be reviewed and the results will be in your chart .   You can review these via  [employer].     Follow up six months for medication management.     Prescriptions will not be filled unless you are compliant with your follow-up appointments or have a follow-up appointment scheduled as per the instruction of your provider. Refills for medications should be requested at the time of your office visit.     Please allow one week for refill requests to be completed.     Contact office with any questions or concerns.   Preferred communication is via [employer]  Please contact [email or web address] if having issues with [employer]    Melania BREEN  [facility]  [address], [address]  [place] [address]  Phone: [phone]    **Charting was completed using voice recognition technology and may include unintended errors**

## 2025-02-05 DIAGNOSIS — I50.20 ACC/AHA STAGE B SYSTOLIC HEART FAILURE: ICD-10-CM

## 2025-02-05 RX ORDER — LOSARTAN POTASSIUM 25 MG/1
25 TABLET ORAL DAILY
Qty: 90 TABLET | Refills: 3 | Status: SHIPPED | OUTPATIENT
Start: 2025-02-05 | End: 2026-02-05

## 2025-02-11 DIAGNOSIS — E78.2 MIXED HYPERLIPIDEMIA: ICD-10-CM

## 2025-02-11 RX ORDER — ATORVASTATIN CALCIUM 40 MG/1
40 TABLET, FILM COATED ORAL DAILY
Qty: 90 TABLET | Refills: 3 | Status: SHIPPED | OUTPATIENT
Start: 2025-02-11 | End: 2026-02-11

## 2025-05-05 ENCOUNTER — APPOINTMENT (OUTPATIENT)
Dept: CARDIOLOGY | Facility: CLINIC | Age: 85
End: 2025-05-05
Payer: MEDICARE

## 2025-05-06 ENCOUNTER — APPOINTMENT (OUTPATIENT)
Dept: CARDIOLOGY | Facility: CLINIC | Age: 85
End: 2025-05-06
Payer: MEDICARE

## 2025-05-06 VITALS
SYSTOLIC BLOOD PRESSURE: 132 MMHG | BODY MASS INDEX: 21.99 KG/M2 | HEIGHT: 65 IN | HEART RATE: 55 BPM | WEIGHT: 132 LBS | DIASTOLIC BLOOD PRESSURE: 82 MMHG

## 2025-05-06 DIAGNOSIS — I50.32 CHRONIC DIASTOLIC (CONGESTIVE) HEART FAILURE: ICD-10-CM

## 2025-05-06 DIAGNOSIS — I63.81: ICD-10-CM

## 2025-05-06 DIAGNOSIS — E78.5 DYSLIPIDEMIA: ICD-10-CM

## 2025-05-06 DIAGNOSIS — E78.2 MIXED HYPERLIPIDEMIA: ICD-10-CM

## 2025-05-06 DIAGNOSIS — I49.8 ATRIAL ARRHYTHMIA: ICD-10-CM

## 2025-05-06 DIAGNOSIS — I34.0 NONRHEUMATIC MITRAL VALVE REGURGITATION: ICD-10-CM

## 2025-05-06 DIAGNOSIS — I42.8 CARDIOMYOPATHY, NONISCHEMIC (MULTI): Primary | ICD-10-CM

## 2025-05-06 DIAGNOSIS — I10 PRIMARY HYPERTENSION: ICD-10-CM

## 2025-05-06 DIAGNOSIS — I44.7 LEFT BUNDLE BRANCH BLOCK (LBBB): ICD-10-CM

## 2025-05-06 PROBLEM — R00.2 PALPITATIONS: Status: RESOLVED | Noted: 2023-07-01 | Resolved: 2025-05-06

## 2025-05-06 PROBLEM — I49.9 CARDIAC ARRHYTHMIA: Status: RESOLVED | Noted: 2023-07-01 | Resolved: 2025-05-06

## 2025-05-06 PROBLEM — R06.02 SOB (SHORTNESS OF BREATH) ON EXERTION: Status: RESOLVED | Noted: 2023-07-01 | Resolved: 2025-05-06

## 2025-05-06 PROCEDURE — 1036F TOBACCO NON-USER: CPT | Performed by: NURSE PRACTITIONER

## 2025-05-06 PROCEDURE — 3075F SYST BP GE 130 - 139MM HG: CPT | Performed by: NURSE PRACTITIONER

## 2025-05-06 PROCEDURE — 99214 OFFICE O/P EST MOD 30 MIN: CPT | Performed by: NURSE PRACTITIONER

## 2025-05-06 PROCEDURE — 1159F MED LIST DOCD IN RCRD: CPT | Performed by: NURSE PRACTITIONER

## 2025-05-06 PROCEDURE — 3079F DIAST BP 80-89 MM HG: CPT | Performed by: NURSE PRACTITIONER

## 2025-05-06 PROCEDURE — 93000 ELECTROCARDIOGRAM COMPLETE: CPT | Performed by: NURSE PRACTITIONER

## 2025-05-06 PROCEDURE — 1160F RVW MEDS BY RX/DR IN RCRD: CPT | Performed by: NURSE PRACTITIONER

## 2025-05-06 RX ORDER — METOPROLOL SUCCINATE 25 MG/1
25 TABLET, EXTENDED RELEASE ORAL DAILY
Qty: 90 TABLET | Refills: 3 | Status: SHIPPED | OUTPATIENT
Start: 2025-05-06 | End: 2026-05-06

## 2025-05-06 RX ORDER — ATORVASTATIN CALCIUM 40 MG/1
40 TABLET, FILM COATED ORAL DAILY
Qty: 90 TABLET | Refills: 3 | Status: SHIPPED | OUTPATIENT
Start: 2025-05-06 | End: 2026-05-06

## 2025-05-06 NOTE — PROGRESS NOTES
Name : Mallory Meyer   : 1940   MRN : [id number]   ENC Date : 2025    Primary Cardiologist: Dr. Del Castillo   Advanced Heart Failure: Dr. Ambrose    CC: HF, MR     HPI:    Mallory Meyer is a 84 y.o. female with a PMHx sig for CVA, stage B systolic HF/NICM/HFmrEF with associated mitral valve regurgitation and LBBB, hypothyroidism, and dyslipidemia who presents today as above.    Of note:  She did not tolerate entresto; even low dose () due to orthostatic hypotension. She did not resume losartan.     Since she saw Dr. Ambrose she has noticed more fatigue. Memory is also not so great. But denies chest pain, pressure, SOB/XIAO, PND, orthopnea, LE edema, palpitations, lightheadedness, dizziness, or syncope.     CV Diagnostics:  Echo (24):  1. The left ventricular systolic function is mildly decreased, with a visually estimated ejection fraction of 45-50%.  2. There is global hypokinesis of the left ventricle with minor regional variations.  3. Left ventricular diastolic filling was indeterminate.  4. There is normal right ventricular global systolic function.  5. The left atrium is mild to moderately dilated.  6. Moderate to severe mitral valve regurgitation.  7. RVSP within normal limits.  8. Aortic valve sclerosis.  9. Mild to moderate aortic valve regurgitation.  10. MV inflow velocites are difficult to interpret with the signifincant MR. They suggest high LA pressures.  11. The MR jet is wide and by LA area appears severe however pulmonary vein flow reversal was not seen.     Echo (22):  1. Left ventricular systolic function is mildly decreased with a 45-50% estimated ejection fraction.  2. Spectral Doppler shows an impaired relaxation pattern of left ventricular diastolic filling.  3. Moderate mitral valve regurgitation.  4. Mild to moderate aortic valve regurgitation.  5. There is global hypokinesis of the left ventricle with minor regional variations.     Echo (22):  1. The  left ventricular systolic function is normal with a 55-60% estimated ejection fraction.  2. Spectral Doppler shows an abnormal pattern of left ventricular diastolic filling.  3. The left atrium is mild to moderately dilated.  4. Moderate to severe mitral valve regurgitation.  5. There is mild to moderate aortic valve regurgitation.     Nuclear stress (2/15/22):  1. No nuclear evidence of pharmacologic stress-induced ischemia or scar. Anterior soft tissue attenuation suggested.  2. Normal LV function with abnormal septal motion consistent with left bundle branch block. EF 71%.  3. Based on these findings low likelihood of flow-limiting coronary artery disease.    ROS: unless otherwise noted in the history of present illness, all other systems were reviewed and they are negative for complaints     Allergies:  Penicillins and Sulfamethoxazole    Current Outpatient Medications   Medication Instructions    aspirin 81 mg EC tablet 1 tablet, Daily    atorvastatin (LIPITOR) 40 mg, oral, Daily    cholecalciferol (VITAMIN D-3) 5,000 Units, Daily    levothyroxine (SYNTHROID, LEVOXYL) 88 mcg, oral, Daily, Take on an empty stomach at the same time each day, either 30 to 60 minutes prior to breakfast    losartan (COZAAR) 25 mg, oral, Daily    metoprolol succinate XL (TOPROL-XL) 25 mg, oral, Daily, Do not crush or chew.    traZODone (DESYREL) 50 mg, oral, Nightly        Last Labs:  CBC  Lab Results   Component Value Date    WBC 8.2 02/20/2024    HGB 11.1 (L) 02/20/2024    HCT 35.0 (L) 02/20/2024    MCV 90 02/20/2024     02/20/2024       CMP  Lab Results   Component Value Date    CALCIUM 9.1 09/10/2024    PHOS 4.2 09/10/2024    PROT 6.4 09/10/2024    ALBUMIN 4.2 09/10/2024    AST 21 09/10/2024    ALT 20 09/10/2024    ALKPHOS 106 09/10/2024    BILITOT 0.7 09/10/2024       BMP   Lab Results   Component Value Date     09/10/2024    K 4.4 09/10/2024     09/10/2024    CO2 29 09/10/2024    GLUCOSE 85 09/10/2024    BUN  "16 09/10/2024    CREATININE 0.82 09/10/2024       LIPID PANEL   Lab Results   Component Value Date    CHOL 116 02/20/2024    TRIG 93 02/20/2024    HDL 56.7 02/20/2024    CHHDL 2.0 02/20/2024    LDLF 32 02/16/2023    VLDL 19 02/20/2024    NHDL 59 02/20/2024       RENAL FUNCTION PANEL   Lab Results   Component Value Date    GLUCOSE 85 09/10/2024     09/10/2024    K 4.4 09/10/2024     09/10/2024    CO2 29 09/10/2024    ANIONGAP 14 09/10/2024    BUN 16 09/10/2024    CREATININE 0.82 09/10/2024    CALCIUM 9.1 09/10/2024    PHOS 4.2 09/10/2024    ALBUMIN 4.2 09/10/2024        Lab Results   Component Value Date     (H) 09/10/2024    HGBA1C 5.4 08/21/2022     I have personally reviewed the above lab results: CBC, chemistry, other labs as you see listed & diagnostics, I have specifically listed the results of these tests above.    Last Recorded Vitals:  Vitals:    05/06/25 1002   BP: 132/82   Pulse: 55   Weight: 59.9 kg (132 lb)   Height: 1.651 m (5' 5\")     Physical Exam:  On exam Ms. Mallory Meyer appears her stated age, is alert and oriented x3, and in no acute distress. Her sclera are anicteric and her oropharynx has moist mucous membranes. Her neck is supple and without thyromegaly. The JVP is ~5 cm of water above the right atrium. Her cardiac exam has regular rhythm, normal S1, S2. No S3/4. There are no murmurs. Her lungs are clear to auscultation bilaterally and there is no dullness to percussion. Her abdomen is soft, nontender with normoactive bowel sounds. There is no HJR. The extremities are warm and without edema. The skin is dry. There is no rash present. The distal pulses are 2-3+ in all four extremities. Her mood and affect are appropriate for todays encounter.     Assessment/Plan:  1) Stage B systolic HF/NICM/HFmrEF (LVEF 45-50%; 6/2024) with associated mitral valve regurgitation and LBBB  First noted to have LV dysfunction on an echocardiogram in 11/2022 (prior echo 2/2022 with preserved " function). In addition her echo notes moderate to severe mitral regurgitation. A nuclear stress test performed showed no evidence of ischemia or infarct. She did not tolerate entresto & was restarted on Losartan  - c/w metoprolol succinate 25 mg daily  - c/w Losartan 25mg every day for now. BP elevated today at 138/82 mmHg. Renal function panel & if able, will adjust  - further discussed JESSE for assessment of her MR as Dr. Ambrose had recommended back in November. After discussion, she is amenable to moving forward with this. Will have my team schedule & have her see Dr. GALICIA on follow up.  Possible Structural heart team consult re: mTEER pending JESSE results.    1) Hypertension: Blood pressure 138/82 mmHg today. See above     She did bring pill bottles with her today: losartan 25mg every day & metoprolol 25mg every day & reports that she is taking them     2) CVA: No obvious evidence of atrial fibrillation on ambulatory 30-day monitor. Dr. Del Castillo has spoken to her in the past about seeing an electrophysiologist for the possibility of a loop recorder to rule out occult atrial fibrillation for her stroke.  She was not interested. Will readdress after JESSE as she seems overwhelmed at this time     3) dyslipidemia: Continue statin therapy as her LDL is at goal.     4) left bundle branch block: Chronic     5) mitral regurgitation: JESSE as aboev     6) atrial arrhythmia: a very short episode of indeterminate rhythm which could have been atrial fibrillation. As above.    JESSE. Follow up with Dr. GALICIA. Will review labs & adjust HF regimen as appropriate. Will determine further follow up with general cardiology at that time.    Tracy M Schwab, APRN-CNP

## 2025-05-06 NOTE — PATIENT INSTRUCTIONS
- blood work today please. Based on blood work, I will likely increase your Losartan. But I will let you know once I receive blood work results  - recommend transesophageal echocardiogram (JESSE)    It was my pleasure to meet you. I look forward to being your cardiac Nurse Practitioner. I am a huge believer in communicating with my patients. Please contact me at any time, if anything is not clear to you regarding anything we have discussed, or if new questions occur to you.

## 2025-05-07 LAB
ALBUMIN SERPL-MCNC: 4.4 G/DL (ref 3.6–5.1)
BNP SERPL-MCNC: 209 PG/ML
BUN SERPL-MCNC: 13 MG/DL (ref 7–25)
BUN/CREAT SERPL: NORMAL (CALC) (ref 6–22)
CALCIUM SERPL-MCNC: 9.5 MG/DL (ref 8.6–10.4)
CHLORIDE SERPL-SCNC: 103 MMOL/L (ref 98–110)
CO2 SERPL-SCNC: 28 MMOL/L (ref 20–32)
CREAT SERPL-MCNC: 0.79 MG/DL (ref 0.6–0.95)
EGFRCR SERPLBLD CKD-EPI 2021: 75 ML/MIN/1.73M2
GLUCOSE SERPL-MCNC: 86 MG/DL (ref 65–99)
PHOSPHATE SERPL-MCNC: 3.9 MG/DL (ref 2.1–4.3)
POTASSIUM SERPL-SCNC: 4.4 MMOL/L (ref 3.5–5.3)
SODIUM SERPL-SCNC: 141 MMOL/L (ref 135–146)

## 2025-05-28 ENCOUNTER — HOSPITAL ENCOUNTER (OUTPATIENT)
Dept: CARDIOLOGY | Facility: HOSPITAL | Age: 85
Discharge: HOME | End: 2025-05-28
Payer: MEDICARE

## 2025-05-28 VITALS
DIASTOLIC BLOOD PRESSURE: 58 MMHG | OXYGEN SATURATION: 97 % | HEIGHT: 65 IN | RESPIRATION RATE: 16 BRPM | BODY MASS INDEX: 21.66 KG/M2 | SYSTOLIC BLOOD PRESSURE: 130 MMHG | WEIGHT: 130 LBS | HEART RATE: 77 BPM

## 2025-05-28 DIAGNOSIS — I34.0 NONRHEUMATIC MITRAL VALVE REGURGITATION: ICD-10-CM

## 2025-05-28 PROCEDURE — 2500000004 HC RX 250 GENERAL PHARMACY W/ HCPCS (ALT 636 FOR OP/ED): Performed by: INTERNAL MEDICINE

## 2025-05-28 PROCEDURE — 93312 ECHO TRANSESOPHAGEAL: CPT | Performed by: INTERNAL MEDICINE

## 2025-05-28 PROCEDURE — 2500000005 HC RX 250 GENERAL PHARMACY W/O HCPCS: Performed by: INTERNAL MEDICINE

## 2025-05-28 PROCEDURE — 99152 MOD SED SAME PHYS/QHP 5/>YRS: CPT | Performed by: INTERNAL MEDICINE

## 2025-05-28 PROCEDURE — 99153 MOD SED SAME PHYS/QHP EA: CPT | Performed by: INTERNAL MEDICINE

## 2025-05-28 PROCEDURE — 93320 DOPPLER ECHO COMPLETE: CPT

## 2025-05-28 PROCEDURE — 7100000010 HC PHASE TWO TIME - EACH INCREMENTAL 1 MINUTE

## 2025-05-28 PROCEDURE — 7100000009 HC PHASE TWO TIME - INITIAL BASE CHARGE

## 2025-05-28 RX ORDER — METOPROLOL SUCCINATE 25 MG/1
25 TABLET, EXTENDED RELEASE ORAL DAILY
OUTPATIENT
Start: 2025-05-28

## 2025-05-28 RX ORDER — ATORVASTATIN CALCIUM 40 MG/1
40 TABLET, FILM COATED ORAL DAILY
OUTPATIENT
Start: 2025-05-28

## 2025-05-28 RX ORDER — LIDOCAINE HYDROCHLORIDE 20 MG/ML
SOLUTION OROPHARYNGEAL AS NEEDED
Status: DISCONTINUED | OUTPATIENT
Start: 2025-05-28 | End: 2025-05-28 | Stop reason: HOSPADM

## 2025-05-28 RX ORDER — ASPIRIN 81 MG/1
81 TABLET ORAL DAILY
OUTPATIENT
Start: 2025-05-28

## 2025-05-28 RX ORDER — MIDAZOLAM HYDROCHLORIDE 1 MG/ML
INJECTION, SOLUTION INTRAMUSCULAR; INTRAVENOUS AS NEEDED
Status: DISCONTINUED | OUTPATIENT
Start: 2025-05-28 | End: 2025-05-28 | Stop reason: HOSPADM

## 2025-05-28 RX ORDER — TRAZODONE HYDROCHLORIDE 50 MG/1
50 TABLET ORAL NIGHTLY
OUTPATIENT
Start: 2025-05-28

## 2025-05-28 RX ORDER — LOSARTAN POTASSIUM 25 MG/1
25 TABLET ORAL DAILY
OUTPATIENT
Start: 2025-05-28

## 2025-05-28 RX ORDER — LEVOTHYROXINE SODIUM 88 UG/1
88 TABLET ORAL DAILY
OUTPATIENT
Start: 2025-05-28

## 2025-05-28 RX ORDER — FENTANYL CITRATE 50 UG/ML
INJECTION, SOLUTION INTRAMUSCULAR; INTRAVENOUS AS NEEDED
Status: DISCONTINUED | OUTPATIENT
Start: 2025-05-28 | End: 2025-05-28 | Stop reason: HOSPADM

## 2025-05-28 RX ORDER — ACETAMINOPHEN 500 MG
125 TABLET ORAL DAILY
OUTPATIENT
Start: 2025-05-28

## 2025-05-28 RX ADMIN — BENZOCAINE 1 SPRAY: 200 SPRAY DENTAL; ORAL; PERIODONTAL at 11:20

## 2025-05-28 RX ADMIN — LIDOCAINE HYDROCHLORIDE 1.25 ML: 20 SOLUTION ORAL at 11:20

## 2025-05-28 RX ADMIN — MIDAZOLAM 1 MG: 1 INJECTION INTRAMUSCULAR; INTRAVENOUS at 11:23

## 2025-05-28 RX ADMIN — MIDAZOLAM 1 MG: 1 INJECTION INTRAMUSCULAR; INTRAVENOUS at 11:20

## 2025-05-28 RX ADMIN — FENTANYL CITRATE 25 MCG: 50 INJECTION, SOLUTION INTRAMUSCULAR; INTRAVENOUS at 11:21

## 2025-05-28 RX ADMIN — FENTANYL CITRATE 25 MCG: 50 INJECTION, SOLUTION INTRAMUSCULAR; INTRAVENOUS at 11:24

## 2025-05-28 ASSESSMENT — PAIN SCALES - GENERAL
PAINLEVEL_OUTOF10: 0 - NO PAIN

## 2025-05-28 ASSESSMENT — PAIN - FUNCTIONAL ASSESSMENT: PAIN_FUNCTIONAL_ASSESSMENT: 0-10

## 2025-05-28 ASSESSMENT — COLUMBIA-SUICIDE SEVERITY RATING SCALE - C-SSRS
1. IN THE PAST MONTH, HAVE YOU WISHED YOU WERE DEAD OR WISHED YOU COULD GO TO SLEEP AND NOT WAKE UP?: NO
2. HAVE YOU ACTUALLY HAD ANY THOUGHTS OF KILLING YOURSELF?: NO
6. HAVE YOU EVER DONE ANYTHING, STARTED TO DO ANYTHING, OR PREPARED TO DO ANYTHING TO END YOUR LIFE?: NO

## 2025-05-28 NOTE — POST-PROCEDURE NOTE
Physician Transition of Care Summary  Invasive Cardiovascular Lab  Physician Transition of Care Summary  Invasive Cardiovascular Lab    Procedure Date: 5/28/2025  Attending: Kulwant Raman MD   Assistants:  Karen (cath lab), Patricia (cath lab), Shruthi (sonographer)    Indications:   Mitral regurgitations    Post-procedure diagnosis:   Mitral regurgitatio    Procedure(s):   Transesophageal Echo (JESSE)    Description of the Procedure:   The patient was brought to the cath lab in a fasting state, and pre-procedure timeout was performed.  Routine monitors were applied, including EKG, BP cuff, pulse oximetry, and end tidal CO2 monitor.  Oxygen 6 LPM by nasal cannula was applied.  Sedation was accomplished with 2 mg of iv Midazolam and 50 micrograms of iv Fentanyl.  The oropharynx was anesthetized with Benzocaine spray.  The patient gargled with viscous lidocaine.  A transesophageal imaging probe was advanced atraumatically into the esophagus, and two dimensional and Doppler images were recorded.  Agitated saline was administered, and contrast images were recorded.  Findings included:      Procedure Findings:   Normal left ventricular size with low normal LV function, LVEF 50-55%  Normal chamber sizes.  Mild to moderate MR, with ERO of 0.1 cm2, no systolic flow reversal at pulmonary veins  No evidence of intracardiac thrombus or mass.  Normal appearing ascending aorta.    Please see [facility] for complete report      Complications:   The patient tolerated the procedure well, and there were no complications.  Postprocedure timeout was performed.      Estimated Blood Loss:   None      Electronically signed by: Kulwant Raman MD, 5/28/2025 11:58 AM        Electronically signed by: Kulwant Raman MD, 5/28/2025 11:58 AM

## 2025-05-30 LAB
EJECTION FRACTION: 53 %
MITRAL VALVE E/A RATIO: 3.24
RIGHT VENTRICLE PEAK SYSTOLIC PRESSURE: 25 MMHG

## 2025-06-05 ENCOUNTER — APPOINTMENT (OUTPATIENT)
Dept: CARDIOLOGY | Facility: CLINIC | Age: 85
End: 2025-06-05
Payer: MEDICARE

## 2025-06-16 ENCOUNTER — OFFICE VISIT (OUTPATIENT)
Dept: PRIMARY CARE | Facility: CLINIC | Age: 85
End: 2025-06-16
Payer: MEDICARE

## 2025-06-16 VITALS
OXYGEN SATURATION: 94 % | WEIGHT: 126 LBS | HEIGHT: 65 IN | HEART RATE: 64 BPM | SYSTOLIC BLOOD PRESSURE: 122 MMHG | DIASTOLIC BLOOD PRESSURE: 62 MMHG | BODY MASS INDEX: 20.99 KG/M2

## 2025-06-16 DIAGNOSIS — I50.32 CHRONIC DIASTOLIC (CONGESTIVE) HEART FAILURE: Primary | ICD-10-CM

## 2025-06-16 DIAGNOSIS — G47.00 INSOMNIA, UNSPECIFIED TYPE: ICD-10-CM

## 2025-06-16 DIAGNOSIS — J06.9 UPPER RESPIRATORY TRACT INFECTION, UNSPECIFIED TYPE: ICD-10-CM

## 2025-06-16 DIAGNOSIS — E03.9 HYPOTHYROIDISM, UNSPECIFIED TYPE: ICD-10-CM

## 2025-06-16 DIAGNOSIS — R05.9 COUGH, UNSPECIFIED TYPE: ICD-10-CM

## 2025-06-16 PROCEDURE — 1159F MED LIST DOCD IN RCRD: CPT | Performed by: NURSE PRACTITIONER

## 2025-06-16 PROCEDURE — 3078F DIAST BP <80 MM HG: CPT | Performed by: NURSE PRACTITIONER

## 2025-06-16 PROCEDURE — 3074F SYST BP LT 130 MM HG: CPT | Performed by: NURSE PRACTITIONER

## 2025-06-16 PROCEDURE — 99214 OFFICE O/P EST MOD 30 MIN: CPT | Performed by: NURSE PRACTITIONER

## 2025-06-16 RX ORDER — CEFDINIR 300 MG/1
300 CAPSULE ORAL 2 TIMES DAILY
Qty: 14 CAPSULE | Refills: 0 | Status: SHIPPED | OUTPATIENT
Start: 2025-06-16 | End: 2025-06-23

## 2025-06-16 RX ORDER — LEVOTHYROXINE SODIUM 88 UG/1
88 TABLET ORAL DAILY
Qty: 90 TABLET | Refills: 1 | Status: SHIPPED | OUTPATIENT
Start: 2025-06-16

## 2025-06-16 RX ORDER — TRAZODONE HYDROCHLORIDE 50 MG/1
50 TABLET ORAL NIGHTLY
Qty: 90 TABLET | Refills: 1 | Status: SHIPPED | OUTPATIENT
Start: 2025-06-16

## 2025-06-16 ASSESSMENT — PATIENT HEALTH QUESTIONNAIRE - PHQ9
SUM OF ALL RESPONSES TO PHQ9 QUESTIONS 1 & 2: 0
1. LITTLE INTEREST OR PLEASURE IN DOING THINGS: NOT AT ALL
2. FEELING DOWN, DEPRESSED OR HOPELESS: NOT AT ALL

## 2025-06-16 NOTE — PROGRESS NOTES
"Subjective   Patient ID: Mallory Meyer is a 84 y.o. female who presents for cough.    HPI     JESSE 3 weeks ago Dr Kulwant Raman  3 days after JESSE developed a cold, flu like symptoms, cough, fatigue.     Remains with a harsh cough.  Worse at night  Sleeping in recliner  Can't seem to get the sputum \"up\"  Took cough med (unsure which one) but it didn't work   Staying hydrated with water and Pedialyte   Denies weight gain  Breathing baseline except during coughing attacks    Medical History[1]      Review of Systems    Objective   /62   Pulse 64   Ht 1.651 m (5' 5\")   Wt 57.2 kg (126 lb)   SpO2 94%   BMI 20.97 kg/m²     Physical Exam    Alert and oriented x 3  Heart regular rate and rhythm without murmur  Lungs clear to auscultation.  Cough noted-harsh. Upper anterior chest most prominent   Gait is non-antalgic  Speech clear.  Hearing adequate.  Psych: Normal affect. Good judgment and insight.     Assessment/Plan     1. Upper respiratory tract infection, unspecified type    Complete prescribed antibiotics as directed. Eat yogurt or take a probiotic (not within the hour of taking the antibiotic) while taking antibiotics.   Increase fluid intake throughout the day.    Irrigate your nose with saline spray 2-4 times per day.   Antihistamine nasal sprays (like Azelastine) also help with nasal congestion and sinus infection. Side effects of Azelastine include an occasional bitter taste. You can reduce the bitter taste by leaning forward, directing the spray toward the outside of your nose, and not sniffing for a few minutes.    Mucinex  DM for cough  Zinc 100 mg daily   Contact the office if not improving after completing the antibiotics.     - cefdinir (Omnicef) 300 mg capsule; Take 1 capsule (300 mg) by mouth 2 times a day for 7 days.  Dispense: 14 capsule; Refill: 0    2. Chronic diastolic (congestive) heart failure (Primary)    - XR chest 2 views; Future    3. Cough, unspecified type    - XR chest 2 views; " Future    4. Hypothyroidism, unspecified type    - levothyroxine (Synthroid, Levoxyl) 88 mcg tablet; Take 1 tablet (88 mcg) by mouth early in the morning.. Take on an empty stomach at the same time each day, either 30 to 60 minutes prior to breakfast  Dispense: 90 tablet; Refill: 1    5. Insomnia, unspecified type    - traZODone (Desyrel) 50 mg tablet; Take 1 tablet (50 mg) by mouth once daily at bedtime.  Dispense: 90 tablet; Refill: 1    Follow up: upcoming med management appt     Medications refills will be completed as discussed.     Any labs or testing that is ordered will be reviewed and the results will be in your chart .   You can review these via  [employer].     Prescriptions will not be filled unless you are compliant with your follow-up appointments or have a follow-up appointment scheduled as per the instruction of your provider. Refills for medications should be requested at the time of your office visit.     Please allow one week for refill requests to be completed.     Contact office with any questions or concerns.   Preferred communication is via  [employer]      Call  Services: [phone] to assist with scheduling.      Melania CHOUDHURY-[facility]  [address], [address]  [place] [address]  Phone: [phone]    **Charting was completed using voice recognition technology and may include unintended errors**           [1]   Past Medical History:  Diagnosis Date    Acute embolism and thrombosis of unspecified deep veins of right lower extremity 12/28/2016    Deep vein blood clot of right lower extremity    Acute respiratory failure with hypoxia 02/02/2019    Acute hypoxemic respiratory failure    Cellulitis of lower leg 01/20/2024    Closed fracture of shaft of metacarpal bone 12/20/2012    COVID-19 viremia 01/03/2024    CVA (cerebral vascular accident) (Multi) 08/08/2022    Diverticulosis of intestine, part unspecified, without  perforation or abscess without bleeding 12/08/2013    Diverticulosis    Dysphasia, 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    Eustachian tube dysfunction 01/20/2010    Hepatitis 01/20/2024    Other conditions influencing health status     No significant past medical history    Other general symptoms and signs 02/05/2018    Flu-like symptoms    Personal history of diseases of the blood and blood-forming organs and certain disorders involving the immune mechanism 01/10/2019    History of anemia    Personal history of diseases of the blood and blood-forming organs and certain disorders involving the immune mechanism 02/02/2019    History of leukocytosis    Personal history of other drug therapy 08/23/2017    History of influenza vaccination    Personal history of other medical treatment 08/17/2020    History of screening mammography    Phlebitis and thrombophlebitis of lower extremities, unspecified 12/28/2016    Phlebitis and thrombophlebitis of lower extremities    Syncope and collapse 12/29/2017    Near syncope

## 2025-06-18 ENCOUNTER — APPOINTMENT (OUTPATIENT)
Dept: PRIMARY CARE | Facility: CLINIC | Age: 85
End: 2025-06-18
Payer: MEDICARE

## 2025-06-18 ENCOUNTER — HOSPITAL ENCOUNTER (OUTPATIENT)
Dept: RADIOLOGY | Facility: CLINIC | Age: 85
Discharge: HOME | End: 2025-06-18
Payer: MEDICARE

## 2025-06-18 DIAGNOSIS — E78.5 DYSLIPIDEMIA: ICD-10-CM

## 2025-06-18 DIAGNOSIS — I50.32 CHRONIC DIASTOLIC (CONGESTIVE) HEART FAILURE: ICD-10-CM

## 2025-06-18 DIAGNOSIS — I10 HYPERTENSION, UNSPECIFIED TYPE: ICD-10-CM

## 2025-06-18 DIAGNOSIS — E03.9 HYPOTHYROIDISM, UNSPECIFIED TYPE: ICD-10-CM

## 2025-06-18 DIAGNOSIS — R05.9 COUGH, UNSPECIFIED TYPE: ICD-10-CM

## 2025-06-18 PROCEDURE — 71046 X-RAY EXAM CHEST 2 VIEWS: CPT

## 2025-06-18 PROCEDURE — 71046 X-RAY EXAM CHEST 2 VIEWS: CPT | Performed by: RADIOLOGY

## 2025-06-23 ENCOUNTER — TELEPHONE (OUTPATIENT)
Dept: PRIMARY CARE | Facility: CLINIC | Age: 85
End: 2025-06-23
Payer: MEDICARE

## 2025-06-23 DIAGNOSIS — J06.9 UPPER RESPIRATORY TRACT INFECTION, UNSPECIFIED TYPE: Primary | ICD-10-CM

## 2025-06-23 RX ORDER — PREDNISONE 20 MG/1
40 TABLET ORAL DAILY
Qty: 10 TABLET | Refills: 0 | Status: SHIPPED | OUTPATIENT
Start: 2025-06-23 | End: 2025-06-28

## 2025-06-23 NOTE — TELEPHONE ENCOUNTER
"Patient called and said the antibiotic you prescribed for her(omnicef) on 06/16 is working but\"not working\". She said she is feeling a little better, however she wants to know if you could send in a different antibiotic for her..    Thanks  "

## 2025-06-24 ENCOUNTER — APPOINTMENT (OUTPATIENT)
Dept: CARDIOLOGY | Facility: CLINIC | Age: 85
End: 2025-06-24
Payer: MEDICARE

## 2025-06-24 VITALS
SYSTOLIC BLOOD PRESSURE: 116 MMHG | BODY MASS INDEX: 21.43 KG/M2 | DIASTOLIC BLOOD PRESSURE: 52 MMHG | HEART RATE: 62 BPM | HEIGHT: 65 IN | OXYGEN SATURATION: 96 % | RESPIRATION RATE: 18 BRPM | WEIGHT: 128.6 LBS

## 2025-06-24 DIAGNOSIS — I50.32 CHRONIC DIASTOLIC (CONGESTIVE) HEART FAILURE: ICD-10-CM

## 2025-06-24 DIAGNOSIS — I42.8 CARDIOMYOPATHY, NONISCHEMIC (MULTI): ICD-10-CM

## 2025-06-24 DIAGNOSIS — I34.0 NONRHEUMATIC MITRAL VALVE REGURGITATION: Primary | ICD-10-CM

## 2025-06-24 PROCEDURE — 99214 OFFICE O/P EST MOD 30 MIN: CPT | Performed by: NURSE PRACTITIONER

## 2025-06-24 PROCEDURE — 1159F MED LIST DOCD IN RCRD: CPT | Performed by: NURSE PRACTITIONER

## 2025-06-24 PROCEDURE — 3074F SYST BP LT 130 MM HG: CPT | Performed by: NURSE PRACTITIONER

## 2025-06-24 PROCEDURE — 1036F TOBACCO NON-USER: CPT | Performed by: NURSE PRACTITIONER

## 2025-06-24 PROCEDURE — 1160F RVW MEDS BY RX/DR IN RCRD: CPT | Performed by: NURSE PRACTITIONER

## 2025-06-24 PROCEDURE — 3078F DIAST BP <80 MM HG: CPT | Performed by: NURSE PRACTITIONER

## 2025-06-24 NOTE — PROGRESS NOTES
Name : Mallory Meyer   : 1940   MRN : [id number]   ENC Date : 2025    Primary Cardiologist: Dr. Del Castillo   Advanced Heart Failure: Dr. Ambrose    CC: HF, MR, follow up JESSE     HPI:    Mallory Meyer is a 84 y.o. female with a PMHx sig for CVA, stage B systolic HF/NICM/HFmrEF with associated mitral valve regurgitation and LBBB, hypothyroidism, and dyslipidemia who presents today as above.    Since I saw Mallory encinas, she did have her JESSE done, which showed mild to mod MR.    Continued c/o fatigue & memory issues. Recent URI, on pred taper, completed course of antibiotics    Of note:          Since she saw Dr. Ambrose she has noticed more fatigue. Memory is also not so great. But denies chest pain, pressure, SOB/XIAO, PND, orthopnea, LE edema, palpitations, lightheadedness, dizziness, or syncope.     CV Diagnostics:  JESSE 25:    1. The left ventricular systolic function is low normal with a visually estimated ejection fraction of 50-55%.   2. There is normal right ventricular global systolic function.   3. The effective mitral regurgitant orifice area by PISA is 0.1 cm2. There is no systolic flow reversal at the pulmonary veins.   4. Mild to moderate mitral valve regurgitation.   5. The estimated RVSP is 25-30 mm.   6. Mild plaque is noted in the aortic arch.    Echo (24):  1. The left ventricular systolic function is mildly decreased, with a visually estimated ejection fraction of 45-50%.  2. There is global hypokinesis of the left ventricle with minor regional variations.  3. Left ventricular diastolic filling was indeterminate.  4. There is normal right ventricular global systolic function.  5. The left atrium is mild to moderately dilated.  6. Moderate to severe mitral valve regurgitation.  7. RVSP within normal limits.  8. Aortic valve sclerosis.  9. Mild to moderate aortic valve regurgitation.  10. MV inflow velocites are difficult to interpret with the signifincant MR. They suggest  high LA pressures.  11. The MR jet is wide and by LA area appears severe however pulmonary vein flow reversal was not seen.     Echo (11/1/22):  1. Left ventricular systolic function is mildly decreased with a 45-50% estimated ejection fraction.  2. Spectral Doppler shows an impaired relaxation pattern of left ventricular diastolic filling.  3. Moderate mitral valve regurgitation.  4. Mild to moderate aortic valve regurgitation.  5. There is global hypokinesis of the left ventricle with minor regional variations.     Echo (2/25/22):  1. The left ventricular systolic function is normal with a 55-60% estimated ejection fraction.  2. Spectral Doppler shows an abnormal pattern of left ventricular diastolic filling.  3. The left atrium is mild to moderately dilated.  4. Moderate to severe mitral valve regurgitation.  5. There is mild to moderate aortic valve regurgitation.     Nuclear stress (2/15/22):  1. No nuclear evidence of pharmacologic stress-induced ischemia or scar. Anterior soft tissue attenuation suggested.  2. Normal LV function with abnormal septal motion consistent with left bundle branch block. EF 71%.  3. Based on these findings low likelihood of flow-limiting coronary artery disease.    ROS: unless otherwise noted in the history of present illness, all other systems were reviewed and they are negative for complaints     Allergies:  Penicillins and Sulfamethoxazole    Current Outpatient Medications   Medication Instructions    aspirin 81 mg EC tablet 1 tablet, Daily    atorvastatin (LIPITOR) 40 mg, oral, Daily    cholecalciferol (VITAMIN D-3) 5,000 Units, Daily    levothyroxine (SYNTHROID, LEVOXYL) 88 mcg, oral, Daily, Take on an empty stomach at the same time each day, either 30 to 60 minutes prior to breakfast    losartan (COZAAR) 25 mg, oral, Daily    metoprolol succinate XL (TOPROL-XL) 25 mg, oral, Daily, Do not crush or chew.    predniSONE (DELTASONE) 40 mg, oral, Daily    traZODone (DESYREL) 50 mg,  "oral, Nightly        Last Labs:  CBC  Lab Results   Component Value Date    WBC 8.2 02/20/2024    HGB 11.1 (L) 02/20/2024    HCT 35.0 (L) 02/20/2024    MCV 90 02/20/2024     02/20/2024       CMP  Lab Results   Component Value Date    CALCIUM 9.5 05/06/2025    PHOS 3.9 05/06/2025    PROT 6.4 09/10/2024    ALBUMIN 4.4 05/06/2025    AST 21 09/10/2024    ALT 20 09/10/2024    ALKPHOS 106 09/10/2024    BILITOT 0.7 09/10/2024       BMP   Lab Results   Component Value Date     05/06/2025    K 4.4 05/06/2025     05/06/2025    CO2 28 05/06/2025    GLUCOSE 86 05/06/2025    BUN 13 05/06/2025    CREATININE 0.79 05/06/2025       LIPID PANEL   Lab Results   Component Value Date    CHOL 116 02/20/2024    TRIG 93 02/20/2024    HDL 56.7 02/20/2024    CHHDL 2.0 02/20/2024    LDLF 32 02/16/2023    VLDL 19 02/20/2024    NHDL 59 02/20/2024       RENAL FUNCTION PANEL   Lab Results   Component Value Date    GLUCOSE 86 05/06/2025     05/06/2025    K 4.4 05/06/2025     05/06/2025    CO2 28 05/06/2025    ANIONGAP 14 09/10/2024    BUN 13 05/06/2025    CREATININE 0.79 05/06/2025    CALCIUM 9.5 05/06/2025    PHOS 3.9 05/06/2025    ALBUMIN 4.4 05/06/2025        Lab Results   Component Value Date     (H) 05/06/2025    HGBA1C 5.4 08/21/2022     I have personally reviewed the above lab results: CBC, chemistry, other labs as you see listed & diagnostics, I have specifically listed the results of these tests above.    Last Recorded Vitals:  Vitals:    06/24/25 1107   BP: 116/52   BP Location: Left arm   Patient Position: Sitting   Pulse: 62   Resp: 18   SpO2: 96%   Weight: 58.3 kg (128 lb 9.6 oz)   Height: 1.651 m (5' 5\")     Physical Exam:  On exam Ms. Mallory Meyer appears her stated age, is alert and oriented x3, and in no acute distress. Her sclera are anicteric and her oropharynx has moist mucous membranes. Her neck is supple and without thyromegaly. The JVP is ~5 cm of water above the right atrium. Her " cardiac exam has regular rhythm, normal S1, S2. No S3/4. There are no murmurs. Her lungs are clear to auscultation bilaterally and there is no dullness to percussion. Her abdomen is soft, nontender with normoactive bowel sounds. There is no HJR. The extremities are warm and without edema. The skin is dry. There is no rash present. The distal pulses are 2-3+ in all four extremities. Her mood and affect are appropriate for todays encounter.     Assessment/Plan:  1) Stage B systolic HF/NICM/HFmrEF (LVEF 45-50%; 6/2024) with associated mitral valve regurgitation and LBBB. First noted to have LV dysfunction on an echocardiogram in 11/2022 (prior echo 2/2022 with preserved function).   - c/w metoprolol succinate 25 mg daily  - c/w Losartan 25mg every day. She did not tolerate entresto; even low dose (12/13) due to orthostatic hypotension.     2) Mitral Valve Regurgitation. Mild to mod on recent JESSE. Would continue to monitor clinically at this time    3) Hypertension: well controlled at this time    4) CVA: No obvious evidence of atrial fibrillation on ambulatory 30-day monitor. Dr. Del Castillo has spoken to her in the past about seeing an electrophysiologist for the possibility of a loop recorder to rule out occult atrial fibrillation for her stroke.  She was not interested.      5) dyslipidemia: Continue statin therapy as her LDL is at goal.     6) left bundle branch block: Chronic     7) atrial arrhythmia: a very short episode of indeterminate rhythm which could have been atrial fibrillation.  See #4    Continue with current treatment plan. Follow up with Dr. Del Castillo in 6 mos.    Tracy M Schwab, APRN-CNP

## 2025-06-25 NOTE — TELEPHONE ENCOUNTER
Spoke with pt , she has picked up the prednisone and is feeling much better.  Will let us know if any issues after finishing the prednisone.

## 2025-07-15 ENCOUNTER — APPOINTMENT (OUTPATIENT)
Dept: PRIMARY CARE | Facility: CLINIC | Age: 85
End: 2025-07-15
Payer: MEDICARE

## 2025-07-25 ENCOUNTER — OFFICE VISIT (OUTPATIENT)
Dept: PRIMARY CARE | Facility: CLINIC | Age: 85
End: 2025-07-25
Payer: MEDICARE

## 2025-07-25 ENCOUNTER — TELEPHONE (OUTPATIENT)
Dept: PRIMARY CARE | Facility: CLINIC | Age: 85
End: 2025-07-25

## 2025-07-25 VITALS
RESPIRATION RATE: 18 BRPM | WEIGHT: 127 LBS | HEART RATE: 60 BPM | BODY MASS INDEX: 21.13 KG/M2 | DIASTOLIC BLOOD PRESSURE: 52 MMHG | OXYGEN SATURATION: 97 % | SYSTOLIC BLOOD PRESSURE: 110 MMHG | TEMPERATURE: 97.8 F

## 2025-07-25 DIAGNOSIS — R21 RASH IN ADULT: Primary | ICD-10-CM

## 2025-07-25 PROCEDURE — 3078F DIAST BP <80 MM HG: CPT

## 2025-07-25 PROCEDURE — G2211 COMPLEX E/M VISIT ADD ON: HCPCS

## 2025-07-25 PROCEDURE — 3074F SYST BP LT 130 MM HG: CPT

## 2025-07-25 PROCEDURE — 99213 OFFICE O/P EST LOW 20 MIN: CPT

## 2025-07-25 PROCEDURE — 1159F MED LIST DOCD IN RCRD: CPT

## 2025-07-25 RX ORDER — LIDOCAINE 40 MG/G
CREAM TOPICAL 4 TIMES DAILY PRN
Qty: 25 G | Refills: 0 | Status: SHIPPED | OUTPATIENT
Start: 2025-07-25 | End: 2026-07-25

## 2025-07-25 NOTE — TELEPHONE ENCOUNTER
Pt's daughter called wanting to know the results of today's appointment.  The pt did not really have much to say.      Daughter's number is [phone]

## 2025-07-28 NOTE — PROGRESS NOTES
Subjective   Patient ID: Mallory Meyer is a 84 y.o. female who presents for Rash (PCP Melania/Rash on back across kidney area and straight down her back. She describes as painful and itchy x 4 days.).    HPI   History of Present Illness  The patient is an 84-year-old female who presents today for concerns of shingles.    She began experiencing symptoms approximately 4 days ago, which she describes as a sensation rather than a visible condition. The area is sensitive to touch and and at times has caused significant itching. She also reports pain, particularly along her posterior ribs, which are tender and warm to the touch. The discomfort intensifies as the day progresses and disrupts her sleep, forcing her to sleep on her stomach. She has not applied any topical treatments but has attempted to alleviate the symptoms with cold showers. She recalls a previous episode of a similar condition under her breasts, which eventually was found to be shingles, self resolving not endorsing any postherpetic neuralgia         Review of Systems    Objective   /52   Pulse 60   Temp 36.6 °C (97.8 °F)   Resp 18   Wt 57.6 kg (127 lb)   SpO2 97%   BMI 21.13 kg/m²     Physical Exam    Skin:          Comments: Small crop of mildly erythematous patch with areas of clearing within.  No vesicular nature notable.  No other discernible skin changes apart from increased sensitivity along other reported areas of discomfort.  Does report significant pain/sensitivity even to light touch       =    Assessment/Plan     Assessment & Plan  1. Suspected shingles.  - Symptoms include sensitivity to touch, itching, and pain, suggesting a possible case of shingles.  - Absence of a rash or blisters makes the diagnosis uncertain; redness noted on the right side.  However, remainder of neurological exam is benign.  - Discussed that the patient is outside the typical window for antiviral treatment with his valacyclovir.  - Prescription for LMX  cream provided, to be applied four times daily; advised to monitor symptoms and report any worsening or new developments.    Follow-up: A follow-up appointment is scheduled for 1 week from now for reevaluation or sooner for any acute concerns or worsening/new symptoms     Deon White DO         This medical note was created with the assistance of artificial intelligence (AI) for documentation purposes. The content has been reviewed and confirmed by the healthcare provider for accuracy and completeness. Patient consented to the use of audio recording and use of AI during their visit.

## 2025-07-31 ENCOUNTER — APPOINTMENT (OUTPATIENT)
Dept: PRIMARY CARE | Facility: CLINIC | Age: 85
End: 2025-07-31
Payer: MEDICARE

## 2025-08-04 ENCOUNTER — APPOINTMENT (OUTPATIENT)
Dept: PRIMARY CARE | Facility: CLINIC | Age: 85
End: 2025-08-04
Payer: MEDICARE

## 2025-08-04 VITALS
HEIGHT: 65 IN | WEIGHT: 126 LBS | HEART RATE: 64 BPM | DIASTOLIC BLOOD PRESSURE: 62 MMHG | BODY MASS INDEX: 20.99 KG/M2 | SYSTOLIC BLOOD PRESSURE: 120 MMHG | OXYGEN SATURATION: 98 %

## 2025-08-04 DIAGNOSIS — L25.9 CONTACT DERMATITIS, UNSPECIFIED CONTACT DERMATITIS TYPE, UNSPECIFIED TRIGGER: Primary | ICD-10-CM

## 2025-08-04 PROCEDURE — 3078F DIAST BP <80 MM HG: CPT | Performed by: NURSE PRACTITIONER

## 2025-08-04 PROCEDURE — 3074F SYST BP LT 130 MM HG: CPT | Performed by: NURSE PRACTITIONER

## 2025-08-04 PROCEDURE — 1159F MED LIST DOCD IN RCRD: CPT | Performed by: NURSE PRACTITIONER

## 2025-08-04 PROCEDURE — 96372 THER/PROPH/DIAG INJ SC/IM: CPT | Performed by: NURSE PRACTITIONER

## 2025-08-04 PROCEDURE — 99213 OFFICE O/P EST LOW 20 MIN: CPT | Performed by: NURSE PRACTITIONER

## 2025-08-04 RX ORDER — METHYLPREDNISOLONE ACETATE 80 MG/ML
80 INJECTION, SUSPENSION INTRA-ARTICULAR; INTRALESIONAL; INTRAMUSCULAR; SOFT TISSUE ONCE
Status: COMPLETED | OUTPATIENT
Start: 2025-08-04 | End: 2025-08-04

## 2025-08-04 RX ORDER — PREDNISONE 20 MG/1
40 TABLET ORAL DAILY
Qty: 10 TABLET | Refills: 0 | Status: SHIPPED | OUTPATIENT
Start: 2025-08-04 | End: 2025-08-09

## 2025-08-04 RX ADMIN — METHYLPREDNISOLONE ACETATE 80 MG: 80 INJECTION, SUSPENSION INTRA-ARTICULAR; INTRALESIONAL; INTRAMUSCULAR; SOFT TISSUE at 11:59

## 2025-08-04 NOTE — PROGRESS NOTES
"Subjective   Patient ID: Mallory Meyer is a 84 y.o. female who presents for Rash .    HPI     Approx 7/20/25 developed a sensation of itching on the inside of her back, hips and below breasts. Occurs on both sides of her body.   Heat makes it feel worse.   At times she will develop a slight red rash but it barely visible  Denies fever, SOB or difficulty swallowing.   States she has had this about 3 times in the past. Her prior physician finally figured out that giving a cortisone injection would resolve problem.       Review of Systems    Objective   /62   Pulse 64   Ht 1.651 m (5' 5\")   Wt 57.2 kg (126 lb)   SpO2 98%   BMI 20.97 kg/m²     Physical Exam  Alert and oriented x 3  Appears healthy  Does not appear/sound dyspneic with conversation  Speech clear.  Hearing adequate.  Psych: Normal affect. Good judgment and insight.   No rash noted     Assessment/Plan     Assessment & Plan  Contact dermatitis, unspecified contact dermatitis type, unspecified trigger  Start Allegra or Zyrtec at night   Orders:    predniSONE (Deltasone) 20 mg tablet; Take 2 tablets (40 mg) by mouth once daily for 5 days. Take with food. Do not take any other NSAIDS while taking script    methylPREDNISolone acetate (DEPO-Medrol) injection 80 mg    Prednisone only to be started if symptoms do not improve in the next day or 2.     Contact office with any questions or concerns or area is not improving   Preferred communication is via  [employer]      Call  Services: [phone] to assist with scheduling.      Melania Raya [facility]  [id number], [address]  [place] [address]  Phone: [phone]    **Charting was completed using voice recognition technology and may include unintended errors**       "

## 2026-01-08 ENCOUNTER — APPOINTMENT (OUTPATIENT)
Dept: CARDIOLOGY | Facility: CLINIC | Age: 86
End: 2026-01-08
Payer: MEDICARE